# Patient Record
Sex: MALE | Race: BLACK OR AFRICAN AMERICAN | NOT HISPANIC OR LATINO | URBAN - METROPOLITAN AREA
[De-identification: names, ages, dates, MRNs, and addresses within clinical notes are randomized per-mention and may not be internally consistent; named-entity substitution may affect disease eponyms.]

---

## 2018-03-15 ENCOUNTER — EMERGENCY (EMERGENCY)
Facility: HOSPITAL | Age: 43
LOS: 1 days | Discharge: HOME | End: 2018-03-15
Admitting: EMERGENCY MEDICINE
Payer: SELF-PAY

## 2018-03-15 PROCEDURE — 99053 MED SERV 10PM-8AM 24 HR FAC: CPT

## 2018-03-15 PROCEDURE — 99284 EMERGENCY DEPT VISIT MOD MDM: CPT | Mod: 25

## 2018-03-16 PROCEDURE — 99283 EMERGENCY DEPT VISIT LOW MDM: CPT | Mod: 25

## 2018-03-16 PROCEDURE — 96372 THER/PROPH/DIAG INJ SC/IM: CPT

## 2018-07-07 ENCOUNTER — EMERGENCY (EMERGENCY)
Facility: HOSPITAL | Age: 43
LOS: 1 days | Discharge: HOME | End: 2018-07-07
Attending: EMERGENCY MEDICINE | Admitting: EMERGENCY MEDICINE
Payer: SELF-PAY

## 2018-07-07 VITALS
RESPIRATION RATE: 20 BRPM | HEART RATE: 87 BPM | DIASTOLIC BLOOD PRESSURE: 108 MMHG | TEMPERATURE: 99 F | SYSTOLIC BLOOD PRESSURE: 178 MMHG | WEIGHT: 218.26 LBS | OXYGEN SATURATION: 100 %

## 2018-07-07 VITALS
RESPIRATION RATE: 18 BRPM | SYSTOLIC BLOOD PRESSURE: 155 MMHG | DIASTOLIC BLOOD PRESSURE: 106 MMHG | OXYGEN SATURATION: 96 % | HEART RATE: 75 BPM

## 2018-07-07 DIAGNOSIS — Z95.5 PRESENCE OF CORONARY ANGIOPLASTY IMPLANT AND GRAFT: Chronic | ICD-10-CM

## 2018-07-07 PROCEDURE — 73502 X-RAY EXAM HIP UNI 2-3 VIEWS: CPT | Mod: 26,LT

## 2018-07-07 PROCEDURE — 99283 EMERGENCY DEPT VISIT LOW MDM: CPT

## 2018-07-07 PROCEDURE — 73502 X-RAY EXAM HIP UNI 2-3 VIEWS: CPT

## 2018-07-07 RX ORDER — IBUPROFEN 200 MG
600 TABLET ORAL ONCE
Qty: 0 | Refills: 0 | Status: DISCONTINUED | OUTPATIENT
Start: 2018-07-07 | End: 2018-07-07

## 2018-07-07 NOTE — ED ADULT NURSE NOTE - OBJECTIVE STATEMENT
Pt presents to ER with chief complaint of left hip pain that does not radiate. Pt denies strenuation, stating  "I just started hurting out of no where" Pt denies injury or fall, no s/s of distress or agitation noted. Hx of stent placement 2 years ago. Denies anticoagulation use. Will continue to monitor patient safety maintained

## 2018-07-07 NOTE — ED ADULT NURSE NOTE - CHPI ED SYMPTOMS NEG
no chills/no dizziness/no fever/no vomiting/no nausea/no numbness/no weakness/no decreased eating/drinking/no tingling

## 2018-07-07 NOTE — ED PROVIDER NOTE - LOCATION
hip hip/left inguinal point tenderness, no inguinal hernias appreciated hip/left inguinal point tenderness, no inguinal hernias appreciated. no lympadenopathy

## 2018-07-07 NOTE — ED PROVIDER NOTE - ATTENDING CONTRIBUTION TO CARE
Left hip/groin pain. Unable to assess etiology. Bony area when palpated. no buldge or masses felt. Educated patient on what to seek in case there is worsening. Advised PCP follow up . Will refer to clinic since he has none. NSAIDs recommended. I performed a face to face bedside interview with patient regarding history of present illness, review of symptoms and past medical history. I completed an independent physical exam.  I have discussed the patient's plan of care with Physician Assistant (PA). I agree with note as stated above, having amended the EMR as needed to reflect my findings.   This includes History of Present Illness, HIV, Past Medical/Surgical/Family/Social History, Allergies and Home Medications, Review of Systems, Physical Exam, and any Progress Notes during the time I functioned as the attending physician for this patient.

## 2018-07-07 NOTE — ED PROVIDER NOTE - CARDIAC, MLM
Normal rate, regular rhythm.  Heart sounds S1, S2.  No murmurs, rubs or gallops. Normal rate, regular rhythm

## 2018-07-07 NOTE — ED PROVIDER NOTE - MEDICAL DECISION MAKING DETAILS
X-ray of left hip. No inguinal hernia appreciated but discussed with patient signs and symptoms to look for. Will d/c home with outpatient follow up.

## 2018-07-07 NOTE — ED PROVIDER NOTE - OBJECTIVE STATEMENT
Pt is a 43yo male c/o left hip pain for the past week. States the pain is worsened with movement and when he goes from a sitting to standing position. The pain does not radiate and is sharp and stabbing in character when present. States the pain is an 8/10. He has tried icy hot without relief and has not tried anything else for the pain. Denies any known trauma to the area but does perform heavy lifting at work. Denies fevers, N/V/D, abdominal pain, urinary complaints, SOB, chest pain, palpitations.

## 2018-11-16 NOTE — ED ADULT NURSE NOTE - TEMPLATE
General Verbalized Understanding/Simple: Patient demonstrates quick and easy understanding/Patient asked questions

## 2018-12-17 ENCOUNTER — EMERGENCY (EMERGENCY)
Facility: HOSPITAL | Age: 43
LOS: 1 days | Discharge: HOME | End: 2018-12-17
Attending: EMERGENCY MEDICINE | Admitting: EMERGENCY MEDICINE
Payer: SELF-PAY

## 2018-12-17 DIAGNOSIS — R05 COUGH: ICD-10-CM

## 2018-12-17 DIAGNOSIS — Z95.5 PRESENCE OF CORONARY ANGIOPLASTY IMPLANT AND GRAFT: Chronic | ICD-10-CM

## 2018-12-17 PROCEDURE — 99283 EMERGENCY DEPT VISIT LOW MDM: CPT | Mod: 25

## 2018-12-17 PROCEDURE — 99053 MED SERV 10PM-8AM 24 HR FAC: CPT

## 2018-12-18 VITALS — SYSTOLIC BLOOD PRESSURE: 166 MMHG | DIASTOLIC BLOOD PRESSURE: 98 MMHG

## 2018-12-18 VITALS
TEMPERATURE: 98 F | HEART RATE: 87 BPM | OXYGEN SATURATION: 100 % | DIASTOLIC BLOOD PRESSURE: 134 MMHG | SYSTOLIC BLOOD PRESSURE: 184 MMHG | RESPIRATION RATE: 18 BRPM | HEIGHT: 69 IN | WEIGHT: 229.94 LBS

## 2018-12-18 PROCEDURE — 71046 X-RAY EXAM CHEST 2 VIEWS: CPT | Mod: 26

## 2018-12-18 PROCEDURE — 99284 EMERGENCY DEPT VISIT MOD MDM: CPT

## 2018-12-18 PROCEDURE — 71046 X-RAY EXAM CHEST 2 VIEWS: CPT

## 2018-12-18 RX ORDER — AMLODIPINE BESYLATE 2.5 MG/1
5 TABLET ORAL ONCE
Qty: 0 | Refills: 0 | Status: COMPLETED | OUTPATIENT
Start: 2018-12-18 | End: 2018-12-18

## 2018-12-18 RX ORDER — METOPROLOL TARTRATE 50 MG
25 TABLET ORAL ONCE
Qty: 0 | Refills: 0 | Status: COMPLETED | OUTPATIENT
Start: 2018-12-18 | End: 2018-12-18

## 2018-12-18 RX ORDER — METOPROLOL TARTRATE 50 MG
1 TABLET ORAL
Qty: 60 | Refills: 0
Start: 2018-12-18 | End: 2019-01-16

## 2018-12-18 RX ORDER — AMLODIPINE BESYLATE 2.5 MG/1
1 TABLET ORAL
Qty: 30 | Refills: 0
Start: 2018-12-18 | End: 2019-01-16

## 2018-12-18 RX ADMIN — AMLODIPINE BESYLATE 5 MILLIGRAM(S): 2.5 TABLET ORAL at 00:27

## 2018-12-18 RX ADMIN — Medication 25 MILLIGRAM(S): at 00:27

## 2018-12-18 RX ADMIN — Medication 200 MILLIGRAM(S): at 00:26

## 2018-12-18 NOTE — ED PROVIDER NOTE - OBJECTIVE STATEMENT
pt recovered from flu approximately 2 weeks ago, but now has had cough for the last 1 week and needs relief.  pt knows about his uncontrolled BP, states that he was on medication but then ran out and does not have insurance so does not see a doctor.  pt has no other symptoms.

## 2018-12-18 NOTE — ED PROVIDER NOTE - NSFOLLOWUPCLINICS_GEN_ALL_ED_FT
[facility]  Family Medicine  [address] [place] [id number]  Phone: [phone]  Fax:   Follow Up Time:

## 2018-12-18 NOTE — ED ADULT NURSE NOTE - NSIMPLEMENTINTERV_GEN_ALL_ED
Implemented All Universal Safety Interventions:  [place] to call system. Call bell, personal items and telephone within reach. Instruct patient to call for assistance. Room bathroom lighting operational. Non-slip footwear when patient is off stretcher. Physically safe environment: no spills, clutter or unnecessary equipment. Stretcher in lowest position, wheels locked, appropriate side rails in place.

## 2018-12-18 NOTE — ED PROVIDER NOTE - PROGRESS NOTE DETAILS
advised pt to go to Family Practice this week to set up care.  renewed pt's prescription for BP meds.

## 2019-05-14 ENCOUNTER — INPATIENT (INPATIENT)
Facility: HOSPITAL | Age: 44
LOS: 0 days | Discharge: HOME | DRG: 312 | End: 2019-05-15
Attending: HOSPITALIST | Admitting: HOSPITALIST
Payer: SELF-PAY

## 2019-05-14 VITALS
TEMPERATURE: 98 F | DIASTOLIC BLOOD PRESSURE: 127 MMHG | WEIGHT: 229.94 LBS | RESPIRATION RATE: 18 BRPM | OXYGEN SATURATION: 97 % | HEIGHT: 68 IN | SYSTOLIC BLOOD PRESSURE: 190 MMHG | HEART RATE: 84 BPM

## 2019-05-14 DIAGNOSIS — Z95.5 PRESENCE OF CORONARY ANGIOPLASTY IMPLANT AND GRAFT: Chronic | ICD-10-CM

## 2019-05-14 DIAGNOSIS — R55 SYNCOPE AND COLLAPSE: ICD-10-CM

## 2019-05-14 LAB
ALBUMIN SERPL ELPH-MCNC: 3.8 G/DL — SIGNIFICANT CHANGE UP (ref 3.3–5)
ALP SERPL-CCNC: 108 U/L — SIGNIFICANT CHANGE UP (ref 40–120)
ALT FLD-CCNC: 40 U/L DA — SIGNIFICANT CHANGE UP (ref 10–45)
ANION GAP SERPL CALC-SCNC: 8 MMOL/L — SIGNIFICANT CHANGE UP (ref 5–17)
APTT BLD: 34.1 SEC — SIGNIFICANT CHANGE UP (ref 27.5–36.3)
AST SERPL-CCNC: 30 U/L — SIGNIFICANT CHANGE UP (ref 10–40)
BILIRUB SERPL-MCNC: 0.6 MG/DL — SIGNIFICANT CHANGE UP (ref 0.2–1.2)
BUN SERPL-MCNC: 14 MG/DL — SIGNIFICANT CHANGE UP (ref 7–23)
CALCIUM SERPL-MCNC: 9.4 MG/DL — SIGNIFICANT CHANGE UP (ref 8.4–10.5)
CHLORIDE SERPL-SCNC: 101 MMOL/L — SIGNIFICANT CHANGE UP (ref 96–108)
CO2 SERPL-SCNC: 27 MMOL/L — SIGNIFICANT CHANGE UP (ref 22–31)
CREAT SERPL-MCNC: 0.95 MG/DL — SIGNIFICANT CHANGE UP (ref 0.5–1.3)
D DIMER BLD IA.RAPID-MCNC: <150 NG/ML DDU — SIGNIFICANT CHANGE UP
GLUCOSE SERPL-MCNC: 104 MG/DL — HIGH (ref 70–99)
HCT VFR BLD CALC: 41.7 % — SIGNIFICANT CHANGE UP (ref 39–50)
HGB BLD-MCNC: 14.3 G/DL — SIGNIFICANT CHANGE UP (ref 13–17)
INR BLD: 1.03 RATIO — SIGNIFICANT CHANGE UP (ref 0.88–1.16)
MCHC RBC-ENTMCNC: 27.8 PG — SIGNIFICANT CHANGE UP (ref 27–34)
MCHC RBC-ENTMCNC: 34.3 GM/DL — SIGNIFICANT CHANGE UP (ref 32–36)
MCV RBC AUTO: 81.1 FL — SIGNIFICANT CHANGE UP (ref 80–100)
NRBC # BLD: 0 /100 WBCS — SIGNIFICANT CHANGE UP (ref 0–0)
PLATELET # BLD AUTO: 291 K/UL — SIGNIFICANT CHANGE UP (ref 150–400)
POTASSIUM SERPL-MCNC: 4.2 MMOL/L — SIGNIFICANT CHANGE UP (ref 3.5–5.3)
POTASSIUM SERPL-SCNC: 4.2 MMOL/L — SIGNIFICANT CHANGE UP (ref 3.5–5.3)
PROT SERPL-MCNC: 7.9 G/DL — SIGNIFICANT CHANGE UP (ref 6–8.3)
PROTHROM AB SERPL-ACNC: 11.5 SEC — SIGNIFICANT CHANGE UP (ref 10–12.9)
RBC # BLD: 5.14 M/UL — SIGNIFICANT CHANGE UP (ref 4.2–5.8)
RBC # FLD: 13.1 % — SIGNIFICANT CHANGE UP (ref 10.3–14.5)
SODIUM SERPL-SCNC: 136 MMOL/L — SIGNIFICANT CHANGE UP (ref 135–145)
TROPONIN I SERPL-MCNC: <.017 NG/ML — LOW (ref 0.02–0.06)
WBC # BLD: 6.96 K/UL — SIGNIFICANT CHANGE UP (ref 3.8–10.5)
WBC # FLD AUTO: 6.96 K/UL — SIGNIFICANT CHANGE UP (ref 3.8–10.5)

## 2019-05-14 PROCEDURE — 70450 CT HEAD/BRAIN W/O DYE: CPT | Mod: 26

## 2019-05-14 PROCEDURE — 93010 ELECTROCARDIOGRAM REPORT: CPT

## 2019-05-14 PROCEDURE — 71045 X-RAY EXAM CHEST 1 VIEW: CPT | Mod: 26

## 2019-05-14 PROCEDURE — 99284 EMERGENCY DEPT VISIT MOD MDM: CPT

## 2019-05-14 PROCEDURE — 99223 1ST HOSP IP/OBS HIGH 75: CPT

## 2019-05-14 RX ORDER — LABETALOL HCL 100 MG
20 TABLET ORAL ONCE
Refills: 0 | Status: COMPLETED | OUTPATIENT
Start: 2019-05-14 | End: 2019-05-14

## 2019-05-14 RX ORDER — ASPIRIN/CALCIUM CARB/MAGNESIUM 324 MG
162 TABLET ORAL ONCE
Refills: 0 | Status: COMPLETED | OUTPATIENT
Start: 2019-05-14 | End: 2019-05-14

## 2019-05-14 RX ORDER — METOPROLOL TARTRATE 50 MG
25 TABLET ORAL DAILY
Refills: 0 | Status: DISCONTINUED | OUTPATIENT
Start: 2019-05-14 | End: 2019-05-14

## 2019-05-14 RX ORDER — ATORVASTATIN CALCIUM 80 MG/1
20 TABLET, FILM COATED ORAL AT BEDTIME
Refills: 0 | Status: DISCONTINUED | OUTPATIENT
Start: 2019-05-14 | End: 2019-05-15

## 2019-05-14 RX ORDER — REGADENOSON 0.08 MG/ML
0.4 INJECTION, SOLUTION INTRAVENOUS ONCE
Refills: 0 | Status: COMPLETED | OUTPATIENT
Start: 2019-05-14 | End: 2019-05-15

## 2019-05-14 RX ORDER — METOPROLOL TARTRATE 50 MG
25 TABLET ORAL DAILY
Refills: 0 | Status: DISCONTINUED | OUTPATIENT
Start: 2019-05-14 | End: 2019-05-15

## 2019-05-14 RX ORDER — ASPIRIN/CALCIUM CARB/MAGNESIUM 324 MG
81 TABLET ORAL DAILY
Refills: 0 | Status: DISCONTINUED | OUTPATIENT
Start: 2019-05-14 | End: 2019-05-15

## 2019-05-14 RX ADMIN — Medication 162 MILLIGRAM(S): at 14:19

## 2019-05-14 RX ADMIN — ATORVASTATIN CALCIUM 20 MILLIGRAM(S): 80 TABLET, FILM COATED ORAL at 20:51

## 2019-05-14 RX ADMIN — Medication 20 MILLIGRAM(S): at 14:16

## 2019-05-14 NOTE — H&P ADULT - HISTORY OF PRESENT ILLNESS
43M with CAD hx of PCI 3 years ago who presents after two episodes of syncope. Patient was walking this morning at 545AM, when he suddenly had a sensation where the room was spinning. It was a persistent sensation that lasted 10 min and resolved. Then, at 9AM, he was climbing a ladder - when he suddenly felt like he was going to fall - and landed in his colleague's arms (the patient works at [employer] - his associates where nearby and caught him so he never fell.) 0/10 pain. NO CHEST PAIN. No SOB. No N/V. No diaphoresis. Patient at his baseline at this time.

## 2019-05-14 NOTE — H&P ADULT - NSHPLABSRESULTS_GEN_ALL_CORE
.                            14.3   6.96  )-----------( 291      ( 14 May 2019 03:05 )             41.7       05-14    136  |  101  |  14  ----------------------------<  104  4.2   |  27  |  0.95    Ca    9.4      14 May 2019 03:05    TPro  7.9  /  Alb  3.8  /  TBili  0.6  /  DBili  x   /  AST  30  /  ALT  40  /  AlkPhos  108  05-14    PT/INR - ( 14 May 2019 13:40 )   PT: 11.5 sec;   INR: 1.03 ratio         PTT - ( 14 May 2019 13:40 )  PTT:34.1 sec  CARDIAC MARKERS ( 14 May 2019 03:05 )  <.017 ng/mL / x     / x     / x     / x          EKG: NSR at 81 - reviewed by me    < from: CT Head No Cont (05.14.19 @ 13:56) >    Unremarkable noncontrast CT of the brain.    < end of copied text >    Case d/w Dr. Gibson, Dr. Day

## 2019-05-14 NOTE — ED PROVIDER NOTE - OBJECTIVE STATEMENT
pt 44 yo m hx MI 2015 with cardiac stents c/o 2 near syncopal events PTA. pt was standing and he started to have pt 42 yo m hx MI 2015 with cardiac stents c/o 2 near syncopal events PTA. pt was standing and he started to have lightheadedness and blurry vision. which lasted about 10 minutes. pt then climbed a ladder at work and had the same feeling but this time thinks he may have had a syncopal event and fel off the ladder. pts co worker caught him. no injury from fall. symptoms resolved but pt currently feels tired   denies CP, SOB, PINA, leg swelling, cough, fever, chills, n/v, abd pain. denies recent travel or recent illness  has not been to cardiologist in  years. pt is only taking asa 81mg  significant fhx for MI, stroke, DVT

## 2019-05-14 NOTE — H&P ADULT - ASSESSMENT
43M with CAD hx of stent x 1, presents with two syncopal episodes. The first episode likely vasovagal, the second is of unclear etiology    #Syncope, unclear etiology  -Orthostatics  -Serial troponins  -Echo  -Stress test in AM  -Cardiology following    #CAD, non-compliant with medications, hx of stent placement  -ASA, beta blocker, statin  -lipid panel, check a1c    #Nicotine dependence  -Patient declines NRT at this time  -Cessation reviewed      DVT ppx: low risk, ambulation encouraged    IMPROVE VTE Individual Risk Assessment          RISK                                                          Points  [  ] Previous VTE                                                3  [  ] Thrombophilia                                             2  [  ] Lower limb paralysis                                   2        (unable to hold up >15 seconds)    [  ] Current Cancer                                             2         (within 6 months)  [  ] Immobilization > 24 hrs                              1  [  ] ICU/CCU stay > 24 hours                             1  [  ] Age > 60                                                         1    IMPROVE VTE Score: 0

## 2019-05-14 NOTE — ED PROVIDER NOTE - CLINICAL SUMMARY MEDICAL DECISION MAKING FREE TEXT BOX
pt 42 yo m hx MI 2015 with cardiac stents c/o 2 near syncopal events PTA. pt was standing and he started to have lightheadedness and blurry vision. which lasted about 10 minutes. pt then climbed a ladder at work and had the same feeling but this time thinks he may have had a syncopal event and fel off the ladder. pts co worker caught him. no injury from fall. symptoms resolved but pt currently feels tired pt 44 yo m hx MI 2015 with cardiac stents c/o 2 near syncopal events PTA. pt was standing and he started to have lightheadedness and blurry vision. which lasted about 10 minutes. pt then climbed a ladder at work and had the same feeling but this time thinks he may have had a syncopal event and fel off the ladder. pts co worker caught him. no injury from fall. symptoms resolved but pt currently feels tired. all labs including troponin and d-dimer negative. cxr neg, ct head no acute findings, discussed with Dr. Ceron- pt is a high risk. will admit

## 2019-05-14 NOTE — ED ADULT NURSE NOTE - OBJECTIVE STATEMENT
42 y/o male hx of 1 stent 3 years ago came in c/o 2 syncopial episodes at work today. pt states he has been seen ehre before for htn and was prescribed metoporolol and has not followed up. pt states today he turned too quickly and blacked out. pt states his coworker caught him pt states he believes it was for about 10 minutes. pt continued working and had another syncopial episode that lasted a few seconds. pt denies getting any aura or feeling anything prior to both episodes. pt denies any chest pain no sob. no fever no chills. pt denies feeling dizzy he states he just feels weird. bp is elevated. Neuro intact. PERRLA.placed on cm continue to mother.

## 2019-05-14 NOTE — ED ADULT NURSE REASSESSMENT NOTE - NS ED NURSE REASSESS COMMENT FT1
pt resting. sleeping awaiting orders from inpatient orders to go up. pt has bed upstairs. no complaints at this time

## 2019-05-14 NOTE — ED PROVIDER NOTE - ATTENDING CONTRIBUTION TO CARE
Daisy with [facility]. 43M active smoker, hx MI 2015 with cardiac stents c/o 2 near syncopal events PTA. pt was standing and he started to have lightheadedness and blurry vision. which lasted about 10 minutes. pt then climbed a ladder at work and had the same feeling but this time had a syncopal event and fell off the ladder. pts co worker caught him. no injury from fall. symptoms resolved but pt currently feels tired. all labs including troponin and d-dimer negative. cxr neg, ct head no acute findings, discussed with Dr. Ceron- pt is a high risk. will admit. I performed a face to face bedside interview with patient regarding history of present illness, review of symptoms and past medical history. I completed an independent physical exam.  I have discussed the patient's plan of care with Physician Assistant (PA). I agree with note as stated above, having amended the EMR as needed to reflect my findings.   This includes History of Present Illness, HIV, Past Medical/Surgical/Family/Social History, Allergies and Home Medications, Review of Systems, Physical Exam, and any Progress Notes during the time I functioned as the attending physician for this patient.

## 2019-05-14 NOTE — ED ADULT TRIAGE NOTE - CHIEF COMPLAINT QUOTE
I almost passed out at work this morning, my coworker caught me as I collapsed. Then I almost fell off a ladder when it happened again

## 2019-05-14 NOTE — H&P ADULT - NSHPSOCIALHISTORY_GEN_ALL_CORE
Current smoker, 1/2 PPD X 20 years, does not want NRT    Fam Hx: Mother, living, 75 yo, MI/CVA; father  at 50 of HIV

## 2019-05-14 NOTE — CONSULT NOTE ADULT - SUBJECTIVE AND OBJECTIVE BOX
Chief Complaint:     43M with CAD hx of PCI 3 years ago who presents after two episodes of syncope. Patient was walking this morning at 545AM, when he suddenly had a sensation where the room was spinning. It was a persistent sensation that lasted 10 min and resolved. Then, at 9AM, he was climbing a ladder - when he suddenly felt like he was going to fall - and landed in his colleague's arms (the patient works at [employer] - his associates where nearby and caught him so he never fell.) 0/10 pain. NO CHEST PAIN. No SOB. No N/V. No diaphoresis. Patient at his baseline at this time.    HPI:    PMH:   MI (myocardial infarction)  Essential hypertension  Smoker    PSH:   H/O right coronary artery stent placement  No significant past surgical history    Family History:  FAMILY HISTORY: father had HIV   CAD (coronary artery disease): mother had CABG multiple family membes with coronary disease      Social History:  Smoking:  Alcohol:  Drugs:    Allergies:  No Known Allergies      Medications:  aspirin enteric coated 81 milliGRAM(s) Oral daily  atorvastatin 20 milliGRAM(s) Oral at bedtime  metoprolol succinate ER 25 milliGRAM(s) Oral daily  regadenoson Injectable 0.4 milliGRAM(s) IV Push once      REVIEW OF SYSTEMS:  CONSTITUTIONAL: No fever, weight loss, or fatigue  EYES: No eye pain, visual disturbances, or discharge  ENMT:  No difficulty hearing, tinnitus, vertigo; No sinus or throat pain  NECK: No pain or stiffness  BREASTS: No pain, masses, or nipple discharge  RESPIRATORY: No cough, wheezing, chills or hemoptysis; No shortness of breath  CARDIOVASCULAR: No chest pain, palpitations, dizziness, or leg swelling  GASTROINTESTINAL: No abdominal or epigastric pain. No nausea, vomiting, or hematemesis; No diarrhea or constipation. No melena or hematochezia.  GENITOURINARY: No dysuria, frequency, hematuria, or incontinence  NEUROLOGICAL: No headaches, memory loss, loss of strength, numbness, or tremors  SKIN: No itching, burning, rashes, or lesions   LYMPH NODES: No enlarged glands  ENDOCRINE: No heat or cold intolerance; No hair loss  MUSCULOSKELETAL: No joint pain or swelling; No muscle, back, or extremity pain  PSYCHIATRIC: No depression, anxiety, mood swings, or difficulty sleeping  HEME/LYMPH: No easy bruising, or bleeding gums  ALLERY AND IMMUNOLOGIC: No hives or eczema    Physical Exam:  T(C): 36.6 (05-14-19 @ 17:09), Max: 36.8 (05-14-19 @ 12:47)  HR: 82 (05-14-19 @ 17:09) (72 - 86)  BP: 121/72 (05-14-19 @ 17:09) (121/72 - 190/127)  RR: 16 (05-14-19 @ 17:09) (14 - 18)  SpO2: 99% (05-14-19 @ 17:09) (97% - 100%)  Wt(kg): --    GENERAL: NAD, well-groomed, well-developed  HEAD:  Atraumatic, Normocephalic  EYES: EOMI, conjunctiva and sclera clear  ENT: Moist mucous membranes,  NECK: Supple, No JVD, no bruits  CHEST/LUNG: Clear to percussion bilaterally; No rales, rhonchi, wheezing, or rubs  HEART: Regular rate and rhythm; No murmurs, rubs, or gallops PMI non displaced.  ABDOMEN: Soft, Nontender, Nondistended; Bowel sounds present  EXTREMITIES:  2+ Peripheral Pulses, No clubbing, cyanosis, or edema  SKIN: No rashes or lesions  NERVOUS SYSTEM:  Cranial Nerves II-XII intact     Cardiovascular Diagnostic Testing:  ECG:    rsr normal    ECHO:    Labs:                        14.3   6.96  )-----------( 291      ( 14 May 2019 03:05 )             41.7     05-14    136  |  101  |  14  ----------------------------<  104<H>  4.2   |  27  |  0.95    Ca    9.4      14 May 2019 03:05    TPro  7.9  /  Alb  3.8  /  TBili  0.6  /  DBili  x   /  AST  30  /  ALT  40  /  AlkPhos  108  05-14    PT/INR - ( 14 May 2019 13:40 )   PT: 11.5 sec;   INR: 1.03 ratio         PTT - ( 14 May 2019 13:40 )  PTT:34.1 sec  CARDIAC MARKERS ( 14 May 2019 03:05 )  <.017 ng/mL / x     / x     / x     / x          Imaging:

## 2019-05-14 NOTE — CONSULT NOTE ADULT - ATTENDING COMMENTS
syncope   I suspect a definite etiology will not be forthcoming. will monitor over nite and check for arrhythmia. would look for vt post a cardiac stent  check echo for eval of lv function and risk of cardiac sudden death      coronary disease history of a cardia stent  history of a cardiac stent is of concern would check nuclear stress in am. check fasting lipids. maintain aspirin

## 2019-05-14 NOTE — H&P ADULT - NSHPPHYSICALEXAM_GEN_ALL_CORE
Vital Signs Last 24 Hrs  T(F): 97.9 (14 May 2019 17:09), Max: 98.2 (14 May 2019 12:47)  HR: 82 (14 May 2019 17:09) (72 - 86)  BP: 121/72 (14 May 2019 17:09) (121/72 - 190/127)  RR: 16 (14 May 2019 17:09) (14 - 18)  SpO2: 99% (14 May 2019 17:09) (97% - 100%)    PHYSICAL EXAM:  GENERAL: NAD, well-groomed, well-developed  HEAD:  Atraumatic, Normocephalic  EYES: EOMI, conjunctiva and sclera clear  ENMT: Moist mucous membranes, Good dentition, no thrush  NECK: Supple, No JVD  CHEST/LUNG: Clear to auscultation bilaterally, good air entry, non-labored breathing  HEART: Regular rate and rhythm; S1/S2, No murmur  ABDOMEN: Soft, Nontender, Nondistended; Bowel sounds present  VASCULAR: Normal pulses, Normal capillary refill  EXTREMITIES: No calf tenderness, No cyanosis, No edema  LYMPH: No lymphadenopathy noted  SKIN: Warm, Intact  PSYCH: Normal mood, Normal affect  NERVOUS SYSTEM:  A/O x3, Good concentration; CN 2-12 intact, No focal deficits

## 2019-05-15 ENCOUNTER — TRANSCRIPTION ENCOUNTER (OUTPATIENT)
Age: 44
End: 2019-05-15

## 2019-05-15 VITALS
SYSTOLIC BLOOD PRESSURE: 151 MMHG | RESPIRATION RATE: 16 BRPM | DIASTOLIC BLOOD PRESSURE: 82 MMHG | OXYGEN SATURATION: 98 % | HEART RATE: 85 BPM

## 2019-05-15 PROCEDURE — 99285 EMERGENCY DEPT VISIT HI MDM: CPT | Mod: 25

## 2019-05-15 PROCEDURE — 93016 CV STRESS TEST SUPVJ ONLY: CPT

## 2019-05-15 PROCEDURE — 80053 COMPREHEN METABOLIC PANEL: CPT

## 2019-05-15 PROCEDURE — 85730 THROMBOPLASTIN TIME PARTIAL: CPT

## 2019-05-15 PROCEDURE — 70450 CT HEAD/BRAIN W/O DYE: CPT

## 2019-05-15 PROCEDURE — 93005 ELECTROCARDIOGRAM TRACING: CPT

## 2019-05-15 PROCEDURE — 71045 X-RAY EXAM CHEST 1 VIEW: CPT

## 2019-05-15 PROCEDURE — 84484 ASSAY OF TROPONIN QUANT: CPT

## 2019-05-15 PROCEDURE — 85027 COMPLETE CBC AUTOMATED: CPT

## 2019-05-15 PROCEDURE — 85379 FIBRIN DEGRADATION QUANT: CPT

## 2019-05-15 PROCEDURE — 78452 HT MUSCLE IMAGE SPECT MULT: CPT

## 2019-05-15 PROCEDURE — A9500: CPT

## 2019-05-15 PROCEDURE — 93018 CV STRESS TEST I&R ONLY: CPT

## 2019-05-15 PROCEDURE — 93010 ELECTROCARDIOGRAM REPORT: CPT

## 2019-05-15 PROCEDURE — 93306 TTE W/DOPPLER COMPLETE: CPT

## 2019-05-15 PROCEDURE — 93017 CV STRESS TEST TRACING ONLY: CPT

## 2019-05-15 PROCEDURE — 36415 COLL VENOUS BLD VENIPUNCTURE: CPT

## 2019-05-15 PROCEDURE — 96374 THER/PROPH/DIAG INJ IV PUSH: CPT

## 2019-05-15 PROCEDURE — 93306 TTE W/DOPPLER COMPLETE: CPT | Mod: 26

## 2019-05-15 PROCEDURE — 85610 PROTHROMBIN TIME: CPT

## 2019-05-15 PROCEDURE — 99239 HOSP IP/OBS DSCHRG MGMT >30: CPT

## 2019-05-15 RX ORDER — METOPROLOL TARTRATE 50 MG
1 TABLET ORAL
Qty: 30 | Refills: 0
Start: 2019-05-15 | End: 2019-06-13

## 2019-05-15 RX ORDER — ATORVASTATIN CALCIUM 80 MG/1
1 TABLET, FILM COATED ORAL
Qty: 30 | Refills: 0
Start: 2019-05-15 | End: 2019-06-13

## 2019-05-15 RX ORDER — LISINOPRIL 2.5 MG/1
1 TABLET ORAL
Qty: 30 | Refills: 0
Start: 2019-05-15 | End: 2019-06-13

## 2019-05-15 RX ADMIN — Medication 81 MILLIGRAM(S): at 14:59

## 2019-05-15 RX ADMIN — REGADENOSON 0.4 MILLIGRAM(S): 0.08 INJECTION, SOLUTION INTRAVENOUS at 11:10

## 2019-05-15 NOTE — PROGRESS NOTE ADULT - SUBJECTIVE AND OBJECTIVE BOX
Patient is a 43y old  Male who presents with a chief complaint of Syncope (14 May 2019 20:35)    Feels well. No chest pain, sob.      Patient seen and examined at bedside.    ALLERGIES:  No Known Allergies    MEDICATIONS  (STANDING):  aspirin enteric coated 81 milliGRAM(s) Oral daily  atorvastatin 20 milliGRAM(s) Oral at bedtime  metoprolol succinate ER 25 milliGRAM(s) Oral daily  regadenoson Injectable 0.4 milliGRAM(s) IV Push once    MEDICATIONS  (PRN):    Vital Signs Last 24 Hrs  T(F): 99.3 (15 May 2019 05:10), Max: 99.3 (15 May 2019 05:10)  HR: 96 (15 May 2019 05:10) (72 - 96)  BP: 156/80 (15 May 2019 05:10) (121/72 - 190/127)  RR: 16 (15 May 2019 05:10) (14 - 18)  SpO2: 98% (15 May 2019 05:10) (97% - 100%)  I&O's Summary    14 May 2019 07:01  -  15 May 2019 07:00  --------------------------------------------------------  IN: 200 mL / OUT: 0 mL / NET: 200 mL      BMI (kg/m2): 39.4 (05-14-19 @ 17:09)  PHYSICAL EXAM:  General: NAD, A/O x 3  ENT: MMM  Neck: Supple, No JVD  Lungs: Clear to auscultation bilaterally  Cardio: RRR, S1/S2, No murmurs  Abdomen: Soft, Nontender, Nondistended; Bowel sounds present  Extremities: No calf tenderness, No pitting edema    LABS:                        14.3   6.96  )-----------( 291      ( 14 May 2019 03:05 )             41.7       05-14    136  |  101  |  14  ----------------------------<  104  4.2   |  27  |  0.95    Ca    9.4      14 May 2019 03:05    TPro  7.9  /  Alb  3.8  /  TBili  0.6  /  DBili  x   /  AST  30  /  ALT  40  /  AlkPhos  108  05-14     eGFR if African American: 114 mL/min/1.73M2 (05-14-19 @ 03:05)  eGFR if Non African American: 99 mL/min/1.73M2 (05-14-19 @ 03:05)    PT/INR - ( 14 May 2019 13:40 )   PT: 11.5 sec;   INR: 1.03 ratio         PTT - ( 14 May 2019 13:40 )  PTT:34.1 sec     CARDIAC MARKERS ( 14 May 2019 03:05 )  <.017 ng/mL / x     / x     / x     / x        CAPILLARY BLOOD GLUCOSE    RADIOLOGY & ADDITIONAL TESTS:    Care Discussed with Consultants/Other Providers: YES

## 2019-05-15 NOTE — DISCHARGE NOTE NURSING/CASE MANAGEMENT/SOCIAL WORK - NSDCDPATPORTLINK_GEN_ALL_CORE
You can access the [facility] Patient Portal, offered by [facility], by registering with the following website: [email or web address]

## 2019-05-15 NOTE — DISCHARGE NOTE PROVIDER - HOSPITAL COURSE
43M with CAD hx of PCI 3 years ago who presents after two episodes of syncope. Patient was walking this morning at 545AM, when he suddenly had a sensation where the room was spinning. It was a persistent sensation that lasted 10 min and resolved. Then, at 9AM, he was climbing a ladder - when he suddenly felt like he was going to fall - and landed in his colleague's arms (the patient works at [employer] - his associates where nearby and caught him so he never fell.) 0/10 pain. NO CHEST PAIN. No SOB. No N/V. No diaphoresis. Patient at his baseline at this time.         Patient admitted and monitored overnight on telemetry.  Patient ruled out ACS and underwent a nuclear stress test which was negative.         Patient advised to follow up with a primary care physician.

## 2019-05-15 NOTE — PROGRESS NOTE ADULT - ASSESSMENT
43M with CAD hx of stent x 1, presents with two syncopal episodes. The first episode likely vasovagal, the second is of unclear etiology    Syncope, unclear etiology  -Orthostatics  -Serial troponins  -Echo  - nuclear stress test today  - if negative, suspect vasovagal    CAD, non-compliant with medications, hx of stent placement  - educated patient on importance of taking medication and follow up with PCP   -ASA, beta blocker, statin  -lipid panel, check a1c    Nicotine dependence - 1/2 pack per day  - advised cessation as likely contributing to his early CAD

## 2019-05-15 NOTE — DISCHARGE NOTE PROVIDER - NSDCCPCAREPLAN_GEN_ALL_CORE_FT
PRINCIPAL DISCHARGE DIAGNOSIS  Diagnosis: Syncope, unspecified syncope type  Assessment and Plan of Treatment:

## 2020-01-09 NOTE — CONSULT NOTE ADULT - PROVIDER SPECIALTY LIST ADULT
Problem: Patient Care Overview  Goal: Plan of Care Review  Flowsheets  Taken 1/8/2020 0312 by Connie Yao RN  Progress: improving  Taken 1/8/2020 2215 by Jaxson Auguste, RN  Plan of Care Reviewed With: patient  Taken 1/9/2020 0319 by Jaxson Auguste, RN  Outcome Summary: pt no c/o pain. IV fluids continue. vss. pt resting comfortably     Problem: Renal Failure/Kidney Injury, Acute (Adult)  Goal: Signs and Symptoms of Listed Potential Problems Will be Absent, Minimized or Managed (Renal Failure/Kidney Injury, Acute)  Flowsheets  Taken 1/8/2020 0312 by Connie Yao RN  Problems Assessed (Acute Renal Failure/Kidney Injury): all  Taken 1/8/2020 0243 by Connie Yao RN  Problems Present (ARF/Kidney Injury): electrolyte imbalance;fluid imbalance      Cardiology

## 2020-03-11 ENCOUNTER — EMERGENCY (EMERGENCY)
Facility: HOSPITAL | Age: 45
LOS: 1 days | Discharge: HOME | End: 2020-03-11
Attending: EMERGENCY MEDICINE | Admitting: HOSPITALIST
Payer: MEDICAID

## 2020-03-11 VITALS
WEIGHT: 240.08 LBS | HEIGHT: 68 IN | RESPIRATION RATE: 17 BRPM | TEMPERATURE: 98 F | DIASTOLIC BLOOD PRESSURE: 122 MMHG | OXYGEN SATURATION: 99 % | HEART RATE: 97 BPM | SYSTOLIC BLOOD PRESSURE: 183 MMHG

## 2020-03-11 DIAGNOSIS — Z95.5 PRESENCE OF CORONARY ANGIOPLASTY IMPLANT AND GRAFT: Chronic | ICD-10-CM

## 2020-03-11 LAB
ALBUMIN SERPL ELPH-MCNC: 3.5 G/DL — SIGNIFICANT CHANGE UP (ref 3.3–5)
ALP SERPL-CCNC: 127 U/L — HIGH (ref 40–120)
ALT FLD-CCNC: 39 U/L — SIGNIFICANT CHANGE UP (ref 10–45)
ANION GAP SERPL CALC-SCNC: 6 MMOL/L — SIGNIFICANT CHANGE UP (ref 5–17)
AST SERPL-CCNC: 19 U/L — SIGNIFICANT CHANGE UP (ref 10–40)
BILIRUB SERPL-MCNC: 0.1 MG/DL — LOW (ref 0.2–1.2)
BUN SERPL-MCNC: 8 MG/DL — SIGNIFICANT CHANGE UP (ref 7–23)
CALCIUM SERPL-MCNC: 9 MG/DL — SIGNIFICANT CHANGE UP (ref 8.4–10.5)
CHLORIDE SERPL-SCNC: 105 MMOL/L — SIGNIFICANT CHANGE UP (ref 96–108)
CO2 SERPL-SCNC: 29 MMOL/L — SIGNIFICANT CHANGE UP (ref 22–31)
CREAT SERPL-MCNC: 1.11 MG/DL — SIGNIFICANT CHANGE UP (ref 0.5–1.3)
GLUCOSE SERPL-MCNC: 120 MG/DL — HIGH (ref 70–99)
HCT VFR BLD CALC: 39.7 % — SIGNIFICANT CHANGE UP (ref 39–50)
HGB BLD-MCNC: 13.6 G/DL — SIGNIFICANT CHANGE UP (ref 13–17)
LIDOCAIN IGE QN: 228 U/L — SIGNIFICANT CHANGE UP (ref 73–393)
MCHC RBC-ENTMCNC: 28.5 PG — SIGNIFICANT CHANGE UP (ref 27–34)
MCHC RBC-ENTMCNC: 34.3 GM/DL — SIGNIFICANT CHANGE UP (ref 32–36)
MCV RBC AUTO: 83.2 FL — SIGNIFICANT CHANGE UP (ref 80–100)
NRBC # BLD: 0 /100 WBCS — SIGNIFICANT CHANGE UP (ref 0–0)
PLATELET # BLD AUTO: 344 K/UL — SIGNIFICANT CHANGE UP (ref 150–400)
POTASSIUM SERPL-MCNC: 4.2 MMOL/L — SIGNIFICANT CHANGE UP (ref 3.5–5.3)
POTASSIUM SERPL-SCNC: 4.2 MMOL/L — SIGNIFICANT CHANGE UP (ref 3.5–5.3)
PROT SERPL-MCNC: 7.8 G/DL — SIGNIFICANT CHANGE UP (ref 6–8.3)
RBC # BLD: 4.77 M/UL — SIGNIFICANT CHANGE UP (ref 4.2–5.8)
RBC # FLD: 13.2 % — SIGNIFICANT CHANGE UP (ref 10.3–14.5)
SODIUM SERPL-SCNC: 140 MMOL/L — SIGNIFICANT CHANGE UP (ref 135–145)
TROPONIN I SERPL-MCNC: <.017 NG/ML — LOW (ref 0.02–0.06)
WBC # BLD: 8.3 K/UL — SIGNIFICANT CHANGE UP (ref 3.8–10.5)
WBC # FLD AUTO: 8.3 K/UL — SIGNIFICANT CHANGE UP (ref 3.8–10.5)

## 2020-03-11 PROCEDURE — 71046 X-RAY EXAM CHEST 2 VIEWS: CPT | Mod: 26

## 2020-03-11 PROCEDURE — 99285 EMERGENCY DEPT VISIT HI MDM: CPT

## 2020-03-11 PROCEDURE — 93010 ELECTROCARDIOGRAM REPORT: CPT

## 2020-03-11 RX ORDER — ASPIRIN/CALCIUM CARB/MAGNESIUM 324 MG
324 TABLET ORAL ONCE
Refills: 0 | Status: COMPLETED | OUTPATIENT
Start: 2020-03-11 | End: 2020-03-11

## 2020-03-11 RX ORDER — METOPROLOL TARTRATE 50 MG
5 TABLET ORAL ONCE
Refills: 0 | Status: COMPLETED | OUTPATIENT
Start: 2020-03-11 | End: 2020-03-11

## 2020-03-11 RX ADMIN — Medication 5 MILLIGRAM(S): at 22:34

## 2020-03-11 RX ADMIN — Medication 324 MILLIGRAM(S): at 22:47

## 2020-03-11 NOTE — ED PROVIDER NOTE - CARDIAC, MLM
Normal rate, regular rhythm.  Heart sounds S1, S2.  No murmurs, rubs or gallops. nontender chest. 2+ rad pulses

## 2020-03-11 NOTE — ED PROVIDER NOTE - CLINICAL SUMMARY MEDICAL DECISION MAKING FREE TEXT BOX
45yo M c CAD, stent, HTN, not compliant with meds c typical mid chest pain, now resolved. hypertensive. concern for ACS. ekg no STEMI.  will check labs, trop. give lopressor for bp  control. will need admission

## 2020-03-11 NOTE — ED ADULT NURSE NOTE - OBJECTIVE STATEMENT
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 Pt denies sob at this time. Pt denies n/v, fevers/chills.

## 2020-03-11 NOTE — ED PROVIDER NOTE - OBJECTIVE STATEMENT
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 assoc c sob. lasted few minutes. no pain now.  no leg pain/swelling /recent periods of immobility.  pain started after smoking. no fever. had mild cough, runny nose last few days. has not taking any meds (for BP, asa) due to lack of insurance.

## 2020-03-12 ENCOUNTER — TRANSCRIPTION ENCOUNTER (OUTPATIENT)
Age: 45
End: 2020-03-12

## 2020-03-12 VITALS
TEMPERATURE: 98 F | HEART RATE: 71 BPM | DIASTOLIC BLOOD PRESSURE: 104 MMHG | OXYGEN SATURATION: 99 % | SYSTOLIC BLOOD PRESSURE: 161 MMHG | RESPIRATION RATE: 16 BRPM

## 2020-03-12 LAB
CHOLEST SERPL-MCNC: 246 MG/DL — HIGH (ref 10–199)
HBA1C BLD-MCNC: 5.7 % — HIGH (ref 4–5.6)
HDLC SERPL-MCNC: 29 MG/DL — LOW
LIPID PNL WITH DIRECT LDL SERPL: 184 MG/DL — HIGH
TOTAL CHOLESTEROL/HDL RATIO MEASUREMENT: 8.5 RATIO — SIGNIFICANT CHANGE UP (ref 3.4–9.6)
TRIGL SERPL-MCNC: 166 MG/DL — HIGH (ref 10–149)
TROPONIN I SERPL-MCNC: <.017 NG/ML — LOW (ref 0.02–0.06)
TSH SERPL-MCNC: 1.78 UIU/ML — SIGNIFICANT CHANGE UP (ref 0.27–4.2)

## 2020-03-12 PROCEDURE — 83690 ASSAY OF LIPASE: CPT

## 2020-03-12 PROCEDURE — 85027 COMPLETE CBC AUTOMATED: CPT

## 2020-03-12 PROCEDURE — A9500: CPT

## 2020-03-12 PROCEDURE — 84484 ASSAY OF TROPONIN QUANT: CPT

## 2020-03-12 PROCEDURE — 93005 ELECTROCARDIOGRAM TRACING: CPT

## 2020-03-12 PROCEDURE — 99204 OFFICE O/P NEW MOD 45 MIN: CPT | Mod: 25

## 2020-03-12 PROCEDURE — 80053 COMPREHEN METABOLIC PANEL: CPT

## 2020-03-12 PROCEDURE — 99220: CPT

## 2020-03-12 PROCEDURE — 71046 X-RAY EXAM CHEST 2 VIEWS: CPT

## 2020-03-12 PROCEDURE — 36415 COLL VENOUS BLD VENIPUNCTURE: CPT

## 2020-03-12 PROCEDURE — 84443 ASSAY THYROID STIM HORMONE: CPT

## 2020-03-12 PROCEDURE — G0378: CPT

## 2020-03-12 PROCEDURE — 99284 EMERGENCY DEPT VISIT MOD MDM: CPT | Mod: 25

## 2020-03-12 PROCEDURE — 96374 THER/PROPH/DIAG INJ IV PUSH: CPT

## 2020-03-12 PROCEDURE — 78452 HT MUSCLE IMAGE SPECT MULT: CPT | Mod: 26

## 2020-03-12 PROCEDURE — 93016 CV STRESS TEST SUPVJ ONLY: CPT

## 2020-03-12 PROCEDURE — 80061 LIPID PANEL: CPT

## 2020-03-12 PROCEDURE — 78452 HT MUSCLE IMAGE SPECT MULT: CPT

## 2020-03-12 PROCEDURE — 83036 HEMOGLOBIN GLYCOSYLATED A1C: CPT

## 2020-03-12 PROCEDURE — 93018 CV STRESS TEST I&R ONLY: CPT

## 2020-03-12 PROCEDURE — 93017 CV STRESS TEST TRACING ONLY: CPT

## 2020-03-12 PROCEDURE — 93306 TTE W/DOPPLER COMPLETE: CPT | Mod: 26,59

## 2020-03-12 PROCEDURE — 93306 TTE W/DOPPLER COMPLETE: CPT

## 2020-03-12 RX ORDER — LISINOPRIL 2.5 MG/1
5 TABLET ORAL DAILY
Refills: 0 | Status: DISCONTINUED | OUTPATIENT
Start: 2020-03-12 | End: 2020-03-15

## 2020-03-12 RX ORDER — ASPIRIN/CALCIUM CARB/MAGNESIUM 324 MG
81 TABLET ORAL DAILY
Refills: 0 | Status: DISCONTINUED | OUTPATIENT
Start: 2020-03-12 | End: 2020-03-15

## 2020-03-12 RX ORDER — ATORVASTATIN CALCIUM 80 MG/1
1 TABLET, FILM COATED ORAL
Qty: 0 | Refills: 0 | DISCHARGE
Start: 2020-03-12

## 2020-03-12 RX ORDER — LISINOPRIL 2.5 MG/1
1 TABLET ORAL
Qty: 30 | Refills: 0
Start: 2020-03-12 | End: 2020-04-10

## 2020-03-12 RX ORDER — METOPROLOL TARTRATE 50 MG
25 TABLET ORAL DAILY
Refills: 0 | Status: DISCONTINUED | OUTPATIENT
Start: 2020-03-12 | End: 2020-03-15

## 2020-03-12 RX ORDER — ASPIRIN/CALCIUM CARB/MAGNESIUM 324 MG
1 TABLET ORAL
Qty: 60 | Refills: 0
Start: 2020-03-12 | End: 2020-05-10

## 2020-03-12 RX ORDER — ASPIRIN/CALCIUM CARB/MAGNESIUM 324 MG
1 TABLET ORAL
Qty: 0 | Refills: 0 | DISCHARGE
Start: 2020-03-12

## 2020-03-12 RX ORDER — ACETAMINOPHEN 500 MG
2 TABLET ORAL
Qty: 0 | Refills: 0 | DISCHARGE
Start: 2020-03-12

## 2020-03-12 RX ORDER — ATORVASTATIN CALCIUM 80 MG/1
1 TABLET, FILM COATED ORAL
Qty: 60 | Refills: 3
Start: 2020-03-12 | End: 2020-11-06

## 2020-03-12 RX ORDER — ACETAMINOPHEN 500 MG
650 TABLET ORAL EVERY 6 HOURS
Refills: 0 | Status: DISCONTINUED | OUTPATIENT
Start: 2020-03-12 | End: 2020-03-15

## 2020-03-12 RX ORDER — ATORVASTATIN CALCIUM 80 MG/1
40 TABLET, FILM COATED ORAL AT BEDTIME
Refills: 0 | Status: DISCONTINUED | OUTPATIENT
Start: 2020-03-12 | End: 2020-03-15

## 2020-03-12 RX ORDER — CARVEDILOL PHOSPHATE 80 MG/1
1 CAPSULE, EXTENDED RELEASE ORAL
Qty: 120 | Refills: 0
Start: 2020-03-12 | End: 2020-05-10

## 2020-03-12 RX ORDER — NITROGLYCERIN 6.5 MG
0.4 CAPSULE, EXTENDED RELEASE ORAL
Refills: 0 | Status: DISCONTINUED | OUTPATIENT
Start: 2020-03-12 | End: 2020-03-15

## 2020-03-12 RX ORDER — INFLUENZA VIRUS VACCINE 15; 15; 15; 15 UG/.5ML; UG/.5ML; UG/.5ML; UG/.5ML
0.5 SUSPENSION INTRAMUSCULAR ONCE
Refills: 0 | Status: DISCONTINUED | OUTPATIENT
Start: 2020-03-12 | End: 2020-03-15

## 2020-03-12 RX ORDER — LISINOPRIL 2.5 MG/1
1 TABLET ORAL
Qty: 0 | Refills: 0 | DISCHARGE
Start: 2020-03-12

## 2020-03-12 RX ORDER — ATORVASTATIN CALCIUM 80 MG/1
20 TABLET, FILM COATED ORAL AT BEDTIME
Refills: 0 | Status: DISCONTINUED | OUTPATIENT
Start: 2020-03-12 | End: 2020-03-12

## 2020-03-12 RX ORDER — REGADENOSON 0.08 MG/ML
0.4 INJECTION, SOLUTION INTRAVENOUS ONCE
Refills: 0 | Status: DISCONTINUED | OUTPATIENT
Start: 2020-03-12 | End: 2020-03-15

## 2020-03-12 RX ORDER — METOPROLOL TARTRATE 50 MG
25 TABLET ORAL ONCE
Refills: 0 | Status: COMPLETED | OUTPATIENT
Start: 2020-03-12 | End: 2020-03-12

## 2020-03-12 RX ORDER — ASPIRIN/CALCIUM CARB/MAGNESIUM 324 MG
1 TABLET ORAL
Qty: 0 | Refills: 0 | DISCHARGE

## 2020-03-12 RX ADMIN — Medication 81 MILLIGRAM(S): at 10:34

## 2020-03-12 RX ADMIN — Medication 25 MILLIGRAM(S): at 02:24

## 2020-03-12 RX ADMIN — LISINOPRIL 5 MILLIGRAM(S): 2.5 TABLET ORAL at 10:34

## 2020-03-12 RX ADMIN — Medication 25 MILLIGRAM(S): at 05:40

## 2020-03-12 NOTE — DISCHARGE NOTE PROVIDER - HOSPITAL COURSE
45y/o male with HTN, hx of CAD, +cardiac stent, smoker, non compliant, currently on no meds, presented w/ mid sternal squeezing chest pain, secondary to ACS with negative trops x 2 and normal EKG without ST/Twave changes or bundle noted. Pt underwent Nuclear stress test with abnormal results noted; Abnormal study with primarily fixed inferior wall defect however with some ischemia identified suggesting infarction with mercedes-infarction ischemia in the inferior and inferoseptal regions. Moderate LV dysfunction with ejection fraction of 38%. ECHO was done with noted; Left ventricular ejection fraction, by visual estimation, is 55 to 60%.  Normal global left ventricular systolic function. Spectral Doppler shows impaired relaxation pattern of left ventricular myocardial filling (Grade I diastolic dysfunction). There is moderate concentric left ventricular hypertrophy. Mild thickening and calcification of the anterior and posterior mitral valve leaflets. Cardiology evaluation and recommendations for cardiac cath today, however, pt adamant about leaving AMA. Extensive time spent by myself as well as cardiology to persuade undergoing LHC. Pt still wanting to leave AMA.    Pt noted with noncompliance with medications and follow up. Educated pt on med compliance and follow up. Provided pt with phone number to Family medicine clinic for close follow up. Educated pt on medication he needs to take.     AMA form filled. Pt understands the risk.     Pt is being discharged with ASA/Atorvastatin/Lisinopril/Coreg

## 2020-03-12 NOTE — H&P ADULT - NEUROLOGICAL DETAILS
responds to pain/sensation intact/alert and oriented x 3/responds to verbal commands/deep reflexes intact/cranial nerves intact

## 2020-03-12 NOTE — H&P ADULT - ASSESSMENT
43y/o male with HTN, hx of CAD, +cardiac stent, smoker, non compliant, currently on no meds, presents w/ mid sternal squeezing chest pain, lasted for 4-5 min, r/o ischemia.    Observation-telemetry  Trend trops  Echo  Resume ASA 81 mg/day, Metoprolol Succ 25 mg/day  Smoking cessation  Cardio eval- Dr Caballero 43y/o male with HTN, hx of CAD, +cardiac stent, smoker, non compliant, currently on no meds, presents w/ mid sternal squeezing chest pain, lasted for 4-5 min, r/o ischemia.    Observation-telemetry  Trend trops  Echo  Resume ASA 81 mg/day, Metoprolol Succ 25 mg/day  Nitro prn for chest pain  Smoking cessation  Cardio eval- Dr Caballero  Stress test  Low risk for DVT-does not require pharmacologic prophylaxis

## 2020-03-12 NOTE — DISCHARGE NOTE PROVIDER - NSDCMRMEDTOKEN_GEN_ALL_CORE_FT
acetaminophen 325 mg oral tablet: 2 tab(s) orally every 6 hours, As needed, Temp greater or equal to 38C (100.4F), Mild Pain (1 - 3)  aspirin 81 mg oral tablet: 1 tab(s) orally once a day  aspirin 81 mg oral tablet, chewable: 1 tab(s) orally once a day  atorvastatin 40 mg oral tablet: 1 tab(s) orally once a day (at bedtime)  atorvastatin 40 mg oral tablet: 1 tab(s) orally once a day (at bedtime)  carvedilol 3.125 mg oral tablet: 1 tab(s) orally 2 times a day   lisinopril 5 mg oral tablet: 1 tab(s) orally once a day  lisinopril 5 mg oral tablet: 1 tab(s) orally once a day

## 2020-03-12 NOTE — CONSULT NOTE ADULT - ATTENDING COMMENTS
chest pains  uncelar etiollgy would consider paitent for nucelar stress tesging. procs and cons of nuclear stesgin plaint treadill cardaic cta and cat discussed n detail inclding a ssnesdityivty ansylsy. nucles stress eping will request a vreeiwo of aparcrio cath rpeot. paiten instutced tostop smoking and alhcohl and told of advese effects on the haet. encougard to maint aint asa given a prior cardiac stetnt. chest pains  unclear etiology would consider patient for nuclear stress testing. pros and cons of nuclear testing plain treadmill cardiac cta and cat discussed n detail including a sensitivity analysis. nuclear stress testing will request a review of a prior cath report. patient instructed to stop smoking and drinking alcohol and told of adverse effects on the heat. encouraged to maintain asa given a prior cardiac stent.

## 2020-03-12 NOTE — PATIENT PROFILE ADULT - FUNCTIONAL SCREEN CURRENT LEVEL: SWALLOWING (IF SCORE 2 OR MORE FOR ANY ITEM, CONSULT REHAB SERVICES), MLM)
wound check in 1week in the office no sex nothing in vagina no heavy lifting no pushing no straining no strenuous activities  pain medication as needed; stool softener; dulcolax as needed if constipated  walk for exercise: helps recovery   continue prenatal vitamins daily especially whole course of breastfeeding  see your OB in the office for follow up post partum check call the office set up appointment in 1-2weeks  clean wound daily with soap and water; please note wound for redness or swelling; if noted go to the office right away so the doctor can evaluate the wound for possible wound infection iron 3xday 0 = swallows foods/liquids without difficulty

## 2020-03-12 NOTE — CONSULT NOTE ADULT - SUBJECTIVE AND OBJECTIVE BOX
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  Pt states it lasted for about 4-5 min.  Pt was given ASA in the ED.  Pt denies fever, chills, cough, abd pain.  Pt states he has not been on his meds for >6 months, because of insurance issues.  EKG NSR 90/min.    HPI:    PMH:   MI (myocardial infarction)  Essential hypertension  Smoker    PSH:   H/O right coronary artery stent placement  No significant past surgical history    Family History:  FAMILY HISTORY:  CAD (coronary artery disease): mother had CABG ()      Social History:  Smoking:  Alcohol:  Drugs:    Allergies:  No Known Allergies      Medications:  acetaminophen   Tablet .. 650 milliGRAM(s) Oral every 6 hours PRN  aspirin  chewable 81 milliGRAM(s) Oral daily  atorvastatin 40 milliGRAM(s) Oral at bedtime  influenza   Vaccine 0.5 milliLiter(s) IntraMuscular once  lisinopril 5 milliGRAM(s) Oral daily  metoprolol succinate ER 25 milliGRAM(s) Oral daily  nitroglycerin     SubLingual 0.4 milliGRAM(s) SubLingual every 5 minutes PRN  regadenoson Injectable 0.4 milliGRAM(s) IV Push once      REVIEW OF SYSTEMS:  CONSTITUTIONAL: No fever, weight loss, or fatigue  EYES: No eye pain, visual disturbances, or discharge  ENMT:  No difficulty hearing, tinnitus, vertigo; No sinus or throat pain  NECK: No pain or stiffness  BREASTS: No pain, masses, or nipple discharge  RESPIRATORY: No cough, wheezing, chills or hemoptysis; No shortness of breath  CARDIOVASCULAR: No chest pain, palpitations, dizziness, or leg swelling  GASTROINTESTINAL: No abdominal or epigastric pain. No nausea, vomiting, or hematemesis; No diarrhea or constipation. No melena or hematochezia.  GENITOURINARY: No dysuria, frequency, hematuria, or incontinence  NEUROLOGICAL: No headaches, memory loss, loss of strength, numbness, or tremors  SKIN: No itching, burning, rashes, or lesions   LYMPH NODES: No enlarged glands  ENDOCRINE: No heat or cold intolerance; No hair loss  MUSCULOSKELETAL: No joint pain or swelling; No muscle, back, or extremity pain  PSYCHIATRIC: No depression, anxiety, mood swings, or difficulty sleeping  HEME/LYMPH: No easy bruising, or bleeding gums  ALLERY AND IMMUNOLOGIC: No hives or eczema    Physical Exam:  T(C): 36.7 (20 @ 10:22), Max: 36.9 (20 @ 00:52)  HR: 77 (20 @ 10:22) (73 - 97)  BP: 169/101 (20 @ 10:22) (148/103 - 185/109)  RR: 16 (20 @ 10:22) (16 - 19)  SpO2: 99% (20 @ 10:22) (99% - 99%)  Wt(kg): --    GENERAL: NAD, well-groomed, well-developed  HEAD:  Atraumatic, Normocephalic  EYES: EOMI, conjunctiva and sclera clear  ENT: Moist mucous membranes,  NECK: Supple, No JVD, no bruits  CHEST/LUNG: Clear to percussion bilaterally; No rales, rhonchi, wheezing, or rubs  HEART: Regular rate and rhythm; No murmurs, rubs, or gallops PMI non displaced.  ABDOMEN: Soft, Nontender, Nondistended; Bowel sounds present  EXTREMITIES:  2+ Peripheral Pulses, No clubbing, cyanosis, or edema  SKIN: No rashes or lesions  NERVOUS SYSTEM:  Cranial Nerves II-XII intact     Cardiovascular Diagnostic Testing:  ECG:      < from: 12 Lead ECG (20 @ 22:05) >  Diagnosis Line Normal sinus rhythm  Minimal voltage criteria for LVH, may be normal variant  Nonspecific T wave abnormality  Clockwise rotation  Abnormal ECG  When compared with ECG of 15-MAY-2019 05:23,  Nonspecific T wave abnormality no longer evident in Anterior leads    Confirmed by JAMIE DORSEY MD () on 3/12/2020 9:08:50 AM    < end of copied text >    ECHO:    < from: TTE Echo Complete w/o contrast w/ Doppler (20 @ 07:58) >    Summary:   1. Left ventricular ejection fraction, by visual estimation, is 55 to 60%.   2. Normal global left ventricular systolic function.   3. Spectral Doppler shows impaired relaxation pattern of left ventricular myocardial filling (Grade I diastolic dysfunction).   4. There is moderate concentric left ventricular hypertrophy.   5. Mild thickening and calcification of the anterior and posterior mitral valve leaflets.    [id number] Jamie Dorsey , Electronically signed on 3/12/2020 at 11:15:28 AM         *** Final ***            JAMIE DORSEY   This document has been electronically signed. Mar 12 2020  7:58AM    < end of copied text >      Labs:                        13.6   8.30  )-----------( 344      ( 11 Mar 2020 22:21 )             39.7         140  |  105  |  8   ----------------------------<  120<H>  4.2   |  29  |  1.11    Ca    9.0      11 Mar 2020 22:21    TPro  7.8  /  Alb  3.5  /  TBili  0.1<L>  /  DBili  x   /  AST  19  /  ALT  39  /  AlkPhos  127<H>  03-11      CARDIAC MARKERS ( 12 Mar 2020 05:30 )  <.017 ng/mL / x     / x     / x     / x      CARDIAC MARKERS ( 11 Mar 2020 22:21 )  <.017 ng/mL / x     / x     / x     / x            Total Cholesterol: 246  LDL: 184  HDL: 29  T    Hemoglobin A1C, Whole Blood: 5.7 % ( @ 09:30)    Thyroid Stimulating Hormone, Serum: 1.78 uIU/mL ( @ 09:30)      Imaging: Chief Complaint:     45y/o male with HTN, hx of CAD, +cardiac stent, smoker, non compliant, currently on no meds, presents to the ED w/ c/o mid sternal squeezing chest pain  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  Pt states it lasted for about 4-5 min.  Pt was given ASA in the ED.  Pt denies fever, chills, cough, abd pain.  Pt states he has not been on his meds for >6 months, because of insurance issues.  EKG NSR 90/min.    HPI:    PMH:   MI (myocardial infarction)  Essential hypertension  Smoker    PSH:   H/O right coronary artery stent placement 10/20/15 ef 35 mid rca .99 stent  No significant past surgical history    Family History:  FAMILY HISTORY:  CAD (coronary artery disease): mother had CABG ()      Social History:  Smoking:  Alcohol:  Drugs:    Allergies:  No Known Allergies      Medications:  acetaminophen   Tablet .. 650 milliGRAM(s) Oral every 6 hours PRN  aspirin  chewable 81 milliGRAM(s) Oral daily  atorvastatin 40 milliGRAM(s) Oral at bedtime  influenza   Vaccine 0.5 milliLiter(s) IntraMuscular once  lisinopril 5 milliGRAM(s) Oral daily  metoprolol succinate ER 25 milliGRAM(s) Oral daily  nitroglycerin     SubLingual 0.4 milliGRAM(s) SubLingual every 5 minutes PRN  regadenoson Injectable 0.4 milliGRAM(s) IV Push once      REVIEW OF SYSTEMS:  CONSTITUTIONAL: No fever, weight loss, or fatigue  EYES: No eye pain, visual disturbances, or discharge  ENMT:  No difficulty hearing, tinnitus, vertigo; No sinus or throat pain  NECK: No pain or stiffness  BREASTS: No pain, masses, or nipple discharge  RESPIRATORY: No cough, wheezing, chills or hemoptysis; No shortness of breath  CARDIOVASCULAR: No chest pain, palpitations, dizziness, or leg swelling  GASTROINTESTINAL: No abdominal or epigastric pain. No nausea, vomiting, or hematemesis; No diarrhea or constipation. No melena or hematochezia.  GENITOURINARY: No dysuria, frequency, hematuria, or incontinence  NEUROLOGICAL: No headaches, memory loss, loss of strength, numbness, or tremors  SKIN: No itching, burning, rashes, or lesions   LYMPH NODES: No enlarged glands  ENDOCRINE: No heat or cold intolerance; No hair loss  MUSCULOSKELETAL: No joint pain or swelling; No muscle, back, or extremity pain  PSYCHIATRIC: No depression, anxiety, mood swings, or difficulty sleeping  HEME/LYMPH: No easy bruising, or bleeding gums  ALLERY AND IMMUNOLOGIC: No hives or eczema    Physical Exam:  T(C): 36.7 (20 @ 10:22), Max: 36.9 (20 @ 00:52)  HR: 77 (20 @ 10:22) (73 - 97)  BP: 169/101 (20 @ 10:22) (148/103 - 185/109)  RR: 16 (20 @ 10:22) (16 - 19)  SpO2: 99% (20 @ 10:22) (99% - 99%)  Wt(kg): --    GENERAL: NAD, well-groomed, well-developed  HEAD:  Atraumatic, Normocephalic  EYES: EOMI, conjunctiva and sclera clear  ENT: Moist mucous membranes,  NECK: Supple, No JVD, no bruits  CHEST/LUNG: Clear to percussion bilaterally; No rales, rhonchi, wheezing, or rubs  HEART: Regular rate and rhythm; No murmurs, rubs, or gallops PMI non displaced.  ABDOMEN: Soft, Nontender, Nondistended; Bowel sounds present  EXTREMITIES:  2+ Peripheral Pulses, No clubbing, cyanosis, or edema  SKIN: No rashes or lesions  NERVOUS SYSTEM:  Cranial Nerves II-XII intact     Cardiovascular Diagnostic Testing:  ECG:      < from: 12 Lead ECG (20 @ 22:05) >  Diagnosis Line Normal sinus rhythm  Minimal voltage criteria for LVH, may be normal variant  Nonspecific T wave abnormality  Clockwise rotation  Abnormal ECG  When compared with ECG of 15-MAY-2019 05:23,  Nonspecific T wave abnormality no longer evident in Anterior leads    Confirmed by JAMIE DORSEY MD () on 3/12/2020 9:08:50 AM    < end of copied text >    ECHO:    < from: TTE Echo Complete w/o contrast w/ Doppler (20 @ 07:58) >    Summary:   1. Left ventricular ejection fraction, by visual estimation, is 55 to 60%.   2. Normal global left ventricular systolic function.   3. Spectral Doppler shows impaired relaxation pattern of left ventricular myocardial filling (Grade I diastolic dysfunction).   4. There is moderate concentric left ventricular hypertrophy.   5. Mild thickening and calcification of the anterior and posterior mitral valve leaflets.    [id number] Jamie Dorsey , Electronically signed on 3/12/2020 at 11:15:28 AM         *** Final ***            JAMIE DORSEY   This document has been electronically signed. Mar 12 2020  7:58AM    < end of copied text >      Labs:                        13.6   8.30  )-----------( 344      ( 11 Mar 2020 22:21 )             39.7     03    140  |  105  |  8   ----------------------------<  120<H>  4.2   |  29  |  1.11    Ca    9.0      11 Mar 2020 22:21    TPro  7.8  /  Alb  3.5  /  TBili  0.1<L>  /  DBili  x   /  AST  19  /  ALT  39  /  AlkPhos  127<H>  0311      CARDIAC MARKERS ( 12 Mar 2020 05:30 )  <.017 ng/mL / x     / x     / x     / x      CARDIAC MARKERS ( 11 Mar 2020 22:21 )  <.017 ng/mL / x     / x     / x     / x            Total Cholesterol: 246  LDL: 184  HDL: 29  T    Hemoglobin A1C, Whole Blood: 5.7 % ( @ 09:30)    Thyroid Stimulating Hormone, Serum: 1.78 uIU/mL ( @ 09:30)      Imaging:

## 2020-03-12 NOTE — DISCHARGE NOTE PROVIDER - NSDCCPCAREPLAN_GEN_ALL_CORE_FT
PRINCIPAL DISCHARGE DIAGNOSIS  Diagnosis: ACS (acute coronary syndrome)  Assessment and Plan of Treatment: Recommendation for cardiac cath, leaving AMA      SECONDARY DISCHARGE DIAGNOSES  Diagnosis: CAD (coronary artery disease)  Assessment and Plan of Treatment:

## 2020-03-12 NOTE — H&P ADULT - NSHPPHYSICALEXAM_GEN_ALL_CORE
Vital Signs (24 Hrs):  T(C): 36.9 (03-12-20 @ 00:52), Max: 36.9 (03-12-20 @ 00:52)  HR: 81 (03-12-20 @ 00:52) (81 - 97)  BP: 165/105 (03-12-20 @ 00:52) (160/101 - 185/109)  RR: 19 (03-12-20 @ 00:52) (17 - 19)  SpO2: 99% (03-12-20 @ 00:52) (99% - 99%)  Daily Height in cm: 172.72 (12 Mar 2020 00:52)

## 2020-03-12 NOTE — PROGRESS NOTE ADULT - SUBJECTIVE AND OBJECTIVE BOX
Adan Pantoja M.D. Pager Number [phone]    Patient is a 44y old  Male who presents with a chief complaint of chest pain (12 Mar 2020 00:06)      SUBJECTIVE / OVERNIGHT EVENTS:  Pt seen and examined at bedside. No acute events overnight. [facility] had similar episode 4 yrs ago, had a stent placed. Has not followed up with any physician. Has been returning to ED for refills of meds but has been off of meds for over a year. [facility] was seen 1 year ago with similar complaints of substernal chest pain. Underwent testing which was negative.  Reason for noncompliance to meds and outpatient follow up due to lack of insurance, although [facility] he is a manager at [employer].   Active smoker, [facility] quit yesterday.   Pt denies cp, palpitations, sob, abd pain, N/V, fever, chills.    ROS:  All other review of systems negative    Allergies    No Known Allergies    Intolerances        MEDICATIONS  (STANDING):  aspirin  chewable 81 milliGRAM(s) Oral daily  atorvastatin 20 milliGRAM(s) Oral at bedtime  influenza   Vaccine 0.5 milliLiter(s) IntraMuscular once  metoprolol succinate ER 25 milliGRAM(s) Oral daily  regadenoson Injectable 0.4 milliGRAM(s) IV Push once    MEDICATIONS  (PRN):  acetaminophen   Tablet .. 650 milliGRAM(s) Oral every 6 hours PRN Temp greater or equal to 38C (100.4F), Mild Pain (1 - 3)  nitroglycerin     SubLingual 0.4 milliGRAM(s) SubLingual every 5 minutes PRN Chest Pain      Vital Signs Last 24 Hrs  T(C): 36.4 (12 Mar 2020 05:54), Max: 36.9 (12 Mar 2020 00:52)  T(F): 97.5 (12 Mar 2020 05:54), Max: 98.5 (12 Mar 2020 00:52)  HR: 79 (12 Mar 2020 07:30) (73 - 97)  BP: 159/87 (12 Mar 2020 07:30) (148/103 - 185/109)  BP(mean): 104 (12 Mar 2020 07:30) (104 - 104)  RR: 18 (12 Mar 2020 05:54) (17 - 19)  SpO2: 99% (12 Mar 2020 05:54) (99% - 99%)  CAPILLARY BLOOD GLUCOSE        I&O's Summary      PHYSICAL EXAM:  GENERAL: NAD, overweight male   HEAD:  Atraumatic, Normocephalic  EYES: EOMI, PERRLA, conjunctiva and sclera clear  NECK: Supple, No JVD  CHEST/LUNG: Clear to auscultation bilaterally; No wheeze  HEART: Regular rate and rhythm; No murmurs, rubs, or gallops  ABDOMEN: Soft, Nontender, Nondistended; Bowel sounds present  EXTREMITIES:  2+ Peripheral Pulses, No clubbing, cyanosis, or edema  NEUROLOGY: AAOx3, non-focal  PSYCH: calm  SKIN: No rashes or lesions    LABS:                        13.6   8.30  )-----------( 344      ( 11 Mar 2020 22:21 )             39.7     03-11    140  |  105  |  8   ----------------------------<  120<H>  4.2   |  29  |  1.11    Ca    9.0      11 Mar 2020 22:21    TPro  7.8  /  Alb  3.5  /  TBili  0.1<L>  /  DBili  x   /  AST  19  /  ALT  39  /  AlkPhos  127<H>  03-11      CARDIAC MARKERS ( 12 Mar 2020 05:30 )  <.017 ng/mL / x     / x     / x     / x      CARDIAC MARKERS ( 11 Mar 2020 22:21 )  <.017 ng/mL / x     / x     / x     / x              RADIOLOGY & ADDITIONAL TESTS:  Results Reviewed:   Imaging Personally Reviewed:  Electrocardiogram Personally Reviewed:    COORDINATION OF CARE:  Care Discussed with Consultants/Other Providers [Y/N]:  Prior or Outpatient Records Reviewed [Y/N]:

## 2020-03-12 NOTE — PROGRESS NOTE ADULT - ASSESSMENT
43y/o male with HTN, hx of CAD, +cardiac stent, smoker, non compliant, currently on no meds, presents w/ mid sternal squeezing chest pain, lasted for 4-5 min, r/o ischemia.    #Chest pain  -R/o out ACS  -Trops negative x 2  -EKG without ST/Twave changes. NSR. No bundle noted  -Continue ASA/Metoprolol/Statin  -Follow up with ECHO  -Follow up with nuclear stress test  -Cardiology follow up   -Monitor tele  -Follow up with 3rd Trop and HA1C levels    #CAD  -S/p one mid RCA stent in 10/2015. Underwent Nuclear Stress test in 2019 which was negative  -Continue ASA/Metoprolol/Statin  -Follow up with Cardiology 43y/o male with HTN, hx of CAD, +cardiac stent, smoker, non compliant, currently on no meds, presents w/ mid sternal squeezing chest pain, lasted for 4-5 min, r/o ischemia.    #Chest pain  -R/o out ACS  -Trops negative x 2  -EKG without ST/Twave changes. NSR. No bundle noted  -Continue ASA/Metoprolol/Statin  -Follow up with ECHO  -Follow up with nuclear stress test  -Cardiology follow up   -Monitor tele  -Follow up with 3rd Trop and HA1C levels    #CAD  -S/p one mid RCA stent in 10/2015. Underwent Nuclear Stress test in 2019 which was negative  -Continue ASA/Metoprolol/Statin  -Follow up with Cardiology    #HTN  -Chronic uncontrolled secondary to noncompliance to meds and follow up/monitor  -Continue Metoprolol  -Restart Lisinopril 5mg daily  -Monitor vitals and titrate meds as beneficial

## 2020-03-12 NOTE — DISCHARGE NOTE NURSING/CASE MANAGEMENT/SOCIAL WORK - PATIENT PORTAL LINK FT
You can access the FollowMyHealth Patient Portal offered by [facility] by registering at the following website: [email or web address]. By joining [facility]’s FollowMyHealth portal, you will also be able to view your health information using other applications (apps) compatible with our system.

## 2020-03-12 NOTE — DISCHARGE NOTE PROVIDER - NSFOLLOWUPCLINICS_GEN_ALL_ED_FT
[facility] Practice [facility]  Family Medicine  [address] [place] [id number]  Phone: [phone]  Fax:   Follow Up Time: 1-3 Days

## 2020-03-12 NOTE — H&P ADULT - HISTORY OF PRESENT ILLNESS
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  Pt states it lasted for lasted few minutes. no pain now.  no leg pain/swelling /recent periods of immobility.  pain started after smoking. no fever. had mild cough, runny nose last few days. has not taking any meds (for BP, asa) due to lack of insurance. 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  Pt states it lasted for about 4-5 min.  Pt was given ASA in the ED.  Pt denies fever, chills, cough, abd pain.  Pt states he has not been on his meds for >6 months, because of insurance issues.    EKG NSR 90/min.

## 2020-03-22 ENCOUNTER — EMERGENCY (EMERGENCY)
Facility: HOSPITAL | Age: 45
LOS: 1 days | Discharge: HOME | End: 2020-03-22
Attending: EMERGENCY MEDICINE | Admitting: EMERGENCY MEDICINE
Payer: MEDICAID

## 2020-03-22 VITALS
SYSTOLIC BLOOD PRESSURE: 162 MMHG | OXYGEN SATURATION: 99 % | DIASTOLIC BLOOD PRESSURE: 128 MMHG | TEMPERATURE: 100 F | WEIGHT: 250 LBS | HEART RATE: 88 BPM | RESPIRATION RATE: 18 BRPM

## 2020-03-22 VITALS — HEART RATE: 84 BPM | DIASTOLIC BLOOD PRESSURE: 97 MMHG | SYSTOLIC BLOOD PRESSURE: 160 MMHG

## 2020-03-22 DIAGNOSIS — R50.9 FEVER, UNSPECIFIED: ICD-10-CM

## 2020-03-22 DIAGNOSIS — Z95.5 PRESENCE OF CORONARY ANGIOPLASTY IMPLANT AND GRAFT: Chronic | ICD-10-CM

## 2020-03-22 LAB
APPEARANCE UR: CLEAR — SIGNIFICANT CHANGE UP
BACTERIA # UR AUTO: NEGATIVE /HPF — SIGNIFICANT CHANGE UP
BILIRUB UR-MCNC: NEGATIVE — SIGNIFICANT CHANGE UP
COLOR SPEC: YELLOW — SIGNIFICANT CHANGE UP
DIFF PNL FLD: ABNORMAL
EPI CELLS # UR: SIGNIFICANT CHANGE UP
GLUCOSE UR QL: NEGATIVE — SIGNIFICANT CHANGE UP
KETONES UR-MCNC: NEGATIVE — SIGNIFICANT CHANGE UP
LEUKOCYTE ESTERASE UR-ACNC: NEGATIVE — SIGNIFICANT CHANGE UP
NITRITE UR-MCNC: NEGATIVE — SIGNIFICANT CHANGE UP
PH UR: 5 — SIGNIFICANT CHANGE UP (ref 5–8)
PROT UR-MCNC: 100
RBC CASTS # UR COMP ASSIST: SIGNIFICANT CHANGE UP /HPF (ref 0–4)
SP GR SPEC: 1.02 — SIGNIFICANT CHANGE UP (ref 1.01–1.02)
UROBILINOGEN FLD QL: NEGATIVE — SIGNIFICANT CHANGE UP
WBC UR QL: SIGNIFICANT CHANGE UP /HPF (ref 0–5)

## 2020-03-22 PROCEDURE — 87086 URINE CULTURE/COLONY COUNT: CPT

## 2020-03-22 PROCEDURE — 71045 X-RAY EXAM CHEST 1 VIEW: CPT | Mod: 26

## 2020-03-22 PROCEDURE — 99283 EMERGENCY DEPT VISIT LOW MDM: CPT

## 2020-03-22 PROCEDURE — 99284 EMERGENCY DEPT VISIT MOD MDM: CPT

## 2020-03-22 PROCEDURE — 71045 X-RAY EXAM CHEST 1 VIEW: CPT

## 2020-03-22 PROCEDURE — 81001 URINALYSIS AUTO W/SCOPE: CPT

## 2020-03-22 PROCEDURE — 99220: CPT

## 2020-03-22 NOTE — ED PROVIDER NOTE - OBJECTIVE STATEMENT
pt c/o mild low back pain for last 2 days, also c/o intermittent cough, subjective fever for last two days as well, fever and back pain resolve when taking ibuprofen.  no h/o cancer, being worked up as outpatient for ACS.

## 2020-03-22 NOTE — ED PROVIDER NOTE - PATIENT PORTAL LINK FT
You can access the FollowMyHealth Patient Portal offered by [facility] by registering at the following website: [email or web address]. By joining [employer] [facility]’s FollowMyHealth portal, you will also be able to view your health information using other applications (apps) compatible with our system.

## 2020-03-22 NOTE — ED ADULT NURSE NOTE - OBJECTIVE STATEMENT
Pt presents to ED awake, alert complaining of lower back pain and fever that began last night. Denies any urinary symptoms. Breathing is regular and non-labored at this time.

## 2020-03-22 NOTE — ED PROVIDER NOTE - NSFOLLOWUPINSTRUCTIONS_ED_ALL_ED_FT
**********  PLEASE QUARANTINE YOURSELF FOR 14 DAYS   ***********************    *****************  YOU ARE CLEARED IF YOU DO NOT HAVE FEVER OR COUGH FOR 3 DAYS WITHOUT TAKING ANY MEDICATION FOR FEVER   *****************************      -- Follow up with your primary care physician in 48 hours.    -- Return to the ER for worsening or persistent symptoms, and/or ANY NEW OR CONCERNING SYMPTOMS.    -- If you have difficulty following up, please call: [phone] to obtain a [facility] doctor or specialist who takes your insurance in your area.

## 2020-03-24 LAB
CULTURE RESULTS: SIGNIFICANT CHANGE UP
SPECIMEN SOURCE: SIGNIFICANT CHANGE UP

## 2020-05-19 NOTE — ED ADULT NURSE NOTE - ALCOHOL PRE SCREEN (AUDIT - C)
Received medical records from Dr. Xi Jacobs. Was diagnosed with possible endometriosis and Hx of tubal disease with a history of an ectopic pregnancy. Will do more testing, including HSG to evaluate the tubal anatomy, ultrasound to evaluate the ovaries and uterus, along with basic ovarian function labs and prescreening lbas.  will have a semen analysis, infectious disease testing and then will return for a telehealth consultation. Statement Selected

## 2020-06-02 ENCOUNTER — EMERGENCY (EMERGENCY)
Facility: HOSPITAL | Age: 45
LOS: 1 days | Discharge: HOME | End: 2020-06-02
Attending: EMERGENCY MEDICINE | Admitting: EMERGENCY MEDICINE
Payer: MEDICAID

## 2020-06-02 VITALS
HEART RATE: 87 BPM | TEMPERATURE: 98 F | OXYGEN SATURATION: 97 % | DIASTOLIC BLOOD PRESSURE: 89 MMHG | HEIGHT: 69 IN | RESPIRATION RATE: 18 BRPM | WEIGHT: 229.94 LBS | SYSTOLIC BLOOD PRESSURE: 149 MMHG

## 2020-06-02 DIAGNOSIS — Z95.5 PRESENCE OF CORONARY ANGIOPLASTY IMPLANT AND GRAFT: Chronic | ICD-10-CM

## 2020-06-02 DIAGNOSIS — R20.0 ANESTHESIA OF SKIN: ICD-10-CM

## 2020-06-02 PROCEDURE — 99285 EMERGENCY DEPT VISIT HI MDM: CPT

## 2020-06-02 PROCEDURE — 93010 ELECTROCARDIOGRAM REPORT: CPT

## 2020-06-03 VITALS
HEART RATE: 82 BPM | DIASTOLIC BLOOD PRESSURE: 62 MMHG | SYSTOLIC BLOOD PRESSURE: 109 MMHG | RESPIRATION RATE: 18 BRPM | OXYGEN SATURATION: 100 %

## 2020-06-03 LAB
ALBUMIN SERPL ELPH-MCNC: 3.5 G/DL — SIGNIFICANT CHANGE UP (ref 3.3–5)
ALP SERPL-CCNC: 133 U/L — HIGH (ref 40–120)
ALT FLD-CCNC: 38 U/L — SIGNIFICANT CHANGE UP (ref 10–45)
ANION GAP SERPL CALC-SCNC: 10 MMOL/L — SIGNIFICANT CHANGE UP (ref 5–17)
AST SERPL-CCNC: 25 U/L — SIGNIFICANT CHANGE UP (ref 10–40)
BILIRUB SERPL-MCNC: 0.2 MG/DL — SIGNIFICANT CHANGE UP (ref 0.2–1.2)
BUN SERPL-MCNC: 17 MG/DL — SIGNIFICANT CHANGE UP (ref 7–23)
CALCIUM SERPL-MCNC: 9.1 MG/DL — SIGNIFICANT CHANGE UP (ref 8.4–10.5)
CHLORIDE SERPL-SCNC: 103 MMOL/L — SIGNIFICANT CHANGE UP (ref 96–108)
CO2 SERPL-SCNC: 26 MMOL/L — SIGNIFICANT CHANGE UP (ref 22–31)
CREAT SERPL-MCNC: 1.29 MG/DL — SIGNIFICANT CHANGE UP (ref 0.5–1.3)
GLUCOSE SERPL-MCNC: 119 MG/DL — HIGH (ref 70–99)
HCT VFR BLD CALC: 38.1 % — LOW (ref 39–50)
HGB BLD-MCNC: 12.7 G/DL — LOW (ref 13–17)
MAGNESIUM SERPL-MCNC: 2 MG/DL — SIGNIFICANT CHANGE UP (ref 1.6–2.6)
MCHC RBC-ENTMCNC: 28 PG — SIGNIFICANT CHANGE UP (ref 27–34)
MCHC RBC-ENTMCNC: 33.3 GM/DL — SIGNIFICANT CHANGE UP (ref 32–36)
MCV RBC AUTO: 84.1 FL — SIGNIFICANT CHANGE UP (ref 80–100)
NRBC # BLD: 0 /100 WBCS — SIGNIFICANT CHANGE UP (ref 0–0)
PLATELET # BLD AUTO: 330 K/UL — SIGNIFICANT CHANGE UP (ref 150–400)
POTASSIUM SERPL-MCNC: 4 MMOL/L — SIGNIFICANT CHANGE UP (ref 3.5–5.3)
POTASSIUM SERPL-SCNC: 4 MMOL/L — SIGNIFICANT CHANGE UP (ref 3.5–5.3)
PROT SERPL-MCNC: 7.7 G/DL — SIGNIFICANT CHANGE UP (ref 6–8.3)
RBC # BLD: 4.53 M/UL — SIGNIFICANT CHANGE UP (ref 4.2–5.8)
RBC # FLD: 13.4 % — SIGNIFICANT CHANGE UP (ref 10.3–14.5)
SODIUM SERPL-SCNC: 139 MMOL/L — SIGNIFICANT CHANGE UP (ref 135–145)
TROPONIN I SERPL-MCNC: <.017 NG/ML — LOW (ref 0.02–0.06)
WBC # BLD: 7.7 K/UL — SIGNIFICANT CHANGE UP (ref 3.8–10.5)
WBC # FLD AUTO: 7.7 K/UL — SIGNIFICANT CHANGE UP (ref 3.8–10.5)

## 2020-06-03 PROCEDURE — 80053 COMPREHEN METABOLIC PANEL: CPT

## 2020-06-03 PROCEDURE — 36415 COLL VENOUS BLD VENIPUNCTURE: CPT

## 2020-06-03 PROCEDURE — 99284 EMERGENCY DEPT VISIT MOD MDM: CPT | Mod: 25

## 2020-06-03 PROCEDURE — 83735 ASSAY OF MAGNESIUM: CPT

## 2020-06-03 PROCEDURE — 93005 ELECTROCARDIOGRAM TRACING: CPT

## 2020-06-03 PROCEDURE — 84484 ASSAY OF TROPONIN QUANT: CPT

## 2020-06-03 PROCEDURE — 71045 X-RAY EXAM CHEST 1 VIEW: CPT

## 2020-06-03 PROCEDURE — 71045 X-RAY EXAM CHEST 1 VIEW: CPT | Mod: 26

## 2020-06-03 PROCEDURE — 85027 COMPLETE CBC AUTOMATED: CPT

## 2020-06-03 RX ORDER — IBUPROFEN 200 MG
600 TABLET ORAL ONCE
Refills: 0 | Status: COMPLETED | OUTPATIENT
Start: 2020-06-03 | End: 2020-06-03

## 2020-06-03 RX ADMIN — Medication 600 MILLIGRAM(S): at 01:23

## 2020-06-03 NOTE — ED PROVIDER NOTE - OBJECTIVE STATEMENT
pt c/o numbness and tingling , moderate, to bilat arms, intermittent for last 3 weeks, more constant today, not related to exertion. also notes some shooting pains down arms also.  no neck pain. no trauma. no arm weakness. no speech or vision changes.  no chest pain, sob, fever, cough.  no arm swelling.  has h/o RCA stent, MI.  was admitted here for ACS w/u march 2020 with abnormal stress. pt signed out ama, declined cardiac cath

## 2020-06-03 NOTE — ED ADULT NURSE NOTE - OBJECTIVE STATEMENT
Pt a&ox3 ambulatory to ED c/o bilateral arm numbness. Pt states he has been feeling numbness for the past 2 weeks but worse tonight. Pt has h/o CAD with cardiac stent x1. Pt denies cp, sob, dizziness, visual changes.

## 2020-06-03 NOTE — ED PROVIDER NOTE - PATIENT PORTAL LINK FT
You can access the FollowMyHealth Patient Portal offered by [facility] by registering at the following website: [email or web address]. By joining [facility]’s FollowMyHealth portal, you will also be able to view your health information using other applications (apps) compatible with our system.

## 2020-06-03 NOTE — ED PROVIDER NOTE - CARE PROVIDER_API CALL
Juan Gibson  CARDIOLOGY  [address] [facility] [address]  [place] [address]  Phone: [phone]  Fax: [phone]  Follow Up Time: 1-3 Days    Antonio Ontiveros  NEUROLOGY  [address] [place] [address]  Phone: [phone]  Fax: [phone]  Follow Up Time: 1-3 Days

## 2020-06-03 NOTE — ED PROVIDER NOTE - PROVIDER TOKENS
PROVIDER:[TOKEN:[[id number]],FOLLOWUP:[1-3 Days]],PROVIDER:[TOKEN:[[id number]],FOLLOWUP:[1-3 Days]]

## 2020-06-03 NOTE — ED PROVIDER NOTE - CLINICAL SUMMARY MEDICAL DECISION MAKING FREE TEXT BOX
bilat arm numbness/tingling/pain. normal nonfocal exam. EKG unremarkable, unchanged from march 2020.  bilat nature of sxs and pain not c/w CVA.  possible ACS although sxs atypical for acs.  will check labs, trop, cxr. ?radicular pain. bilat arm numbness/tingling/pain. normal nonfocal exam. EKG unremarkable, unchanged from march 2020.  bilat nature of sxs and pain not c/w CVA.  possible ACS although sxs atypical for acs.  will check labs, trop, cxr. ?radicular pain.    0110: labs, trop ekg, cxr unremarkable. VSS. sxs ongoing for 3 wks already. told pt to f/u c cardiology and neuro.  return for worsening or new sxs

## 2020-06-03 NOTE — ED PROVIDER NOTE - CARE PROVIDERS DIRECT ADDRESSES
[email or web address]

## 2020-06-03 NOTE — ED PROVIDER NOTE - NSFOLLOWUPINSTRUCTIONS_ED_ALL_ED_FT
-take advil as needed for pain  -follow up with cardiology and neurology in next 1-3 days  -return for worse symptoms, weakness, chest pain, difficult breathing or other concerns    Paresthesia    WHAT YOU NEED TO KNOW:    Paresthesia is numbness, tingling, or burning. It can happen in any part of your body, but usually occurs in your legs, feet, arms, or hands.    DISCHARGE INSTRUCTIONS:    Return to the emergency department if:     You have severe pain along with numbness and tingling.      Your legs suddenly become cold. You have trouble moving your legs, and they ache.      You have increased weakness in a part of your body.      You have uncontrolled movements.    Contact your healthcare provider or neurologist if:     Your symptoms do not improve.      You have symptoms in more than one part of your body.      You have questions or concerns about your condition or care.    Manage paresthesia:     Protect the area from injury. You may injure or burn yourself if you lose feeling in the area. Be careful when you touch anything that could be hot. Wear sturdy shoes to protect your feet. Ask about other ways to protect yourself.       Go to physical or occupational therapy if directed. Your provider may recommend therapy if you have a condition such as carpal tunnel syndrome. A physical therapist can teach you exercises to help strengthen the area or increase your ability to move it. An occupational therapist can help you find new ways to do your daily activities.      Manage health conditions that can cause paresthesia. Work with your diabetes specialist if you have uncontrolled diabetes. A dietitian or your healthcare provider can help you create a meal plan if you have low vitamin B levels. Your provider can help you manage your health if you have multiple sclerosis or you had a stroke. It is important to manage health conditions to stop paresthesia or prevent it from getting worse.    Follow up with your healthcare provider or neurologist as directed: Your healthcare provider may refer you to a specialist. Write down your questions so you remember to ask them during your visits.

## 2020-06-14 NOTE — ED ADULT NURSE NOTE - MUSCULOSKELETAL WDL
Full range of motion of upper and lower extremities, no joint tenderness/swelling. Implemented All Universal Safety Interventions:  [place] to call system. Call bell, personal items and telephone within reach. Instruct patient to call for assistance. Room bathroom lighting operational. Non-slip footwear when patient is off stretcher. Physically safe environment: no spills, clutter or unnecessary equipment. Stretcher in lowest position, wheels locked, appropriate side rails in place.

## 2021-03-16 NOTE — H&P ADULT - REASON FOR ADMISSION
Head,  normocephalic,  atraumatic,  Face,  Face within normal limits,  Ears,  External ears within normal limits,  Nose/Nasopharynx,  External nose  normal appearance,  nares patent,  no nasal discharge,  Mouth and Throat,  Oral cavity appearance normal,  Breath odor normal,  Lips,  Appearance normal
Syncope

## 2021-04-12 ENCOUNTER — EMERGENCY (EMERGENCY)
Facility: HOSPITAL | Age: 46
LOS: 1 days | Discharge: HOME | End: 2021-04-12
Attending: EMERGENCY MEDICINE | Admitting: EMERGENCY MEDICINE
Payer: MEDICAID

## 2021-04-12 VITALS
OXYGEN SATURATION: 99 % | SYSTOLIC BLOOD PRESSURE: 178 MMHG | HEIGHT: 69 IN | HEART RATE: 93 BPM | WEIGHT: 269.18 LBS | DIASTOLIC BLOOD PRESSURE: 100 MMHG | RESPIRATION RATE: 18 BRPM | TEMPERATURE: 98 F

## 2021-04-12 VITALS — DIASTOLIC BLOOD PRESSURE: 94 MMHG | SYSTOLIC BLOOD PRESSURE: 165 MMHG

## 2021-04-12 DIAGNOSIS — Z95.5 PRESENCE OF CORONARY ANGIOPLASTY IMPLANT AND GRAFT: Chronic | ICD-10-CM

## 2021-04-12 LAB
ALBUMIN SERPL ELPH-MCNC: 3.6 G/DL — SIGNIFICANT CHANGE UP (ref 3.3–5)
ALP SERPL-CCNC: 114 U/L — SIGNIFICANT CHANGE UP (ref 40–120)
ALT FLD-CCNC: 25 U/L — SIGNIFICANT CHANGE UP (ref 10–45)
ANION GAP SERPL CALC-SCNC: 7 MMOL/L — SIGNIFICANT CHANGE UP (ref 5–17)
APPEARANCE UR: ABNORMAL
AST SERPL-CCNC: 22 U/L — SIGNIFICANT CHANGE UP (ref 10–40)
BACTERIA # UR AUTO: ABNORMAL /HPF
BASOPHILS # BLD AUTO: 0.1 K/UL — SIGNIFICANT CHANGE UP (ref 0–0.2)
BASOPHILS NFR BLD AUTO: 0.5 % — SIGNIFICANT CHANGE UP (ref 0–2)
BILIRUB SERPL-MCNC: 0.6 MG/DL — SIGNIFICANT CHANGE UP (ref 0.2–1.2)
BILIRUB UR-MCNC: NEGATIVE — SIGNIFICANT CHANGE UP
BUN SERPL-MCNC: 8 MG/DL — SIGNIFICANT CHANGE UP (ref 7–23)
CALCIUM SERPL-MCNC: 9.7 MG/DL — SIGNIFICANT CHANGE UP (ref 8.4–10.5)
CHLORIDE SERPL-SCNC: 100 MMOL/L — SIGNIFICANT CHANGE UP (ref 96–108)
CO2 SERPL-SCNC: 29 MMOL/L — SIGNIFICANT CHANGE UP (ref 22–31)
COLOR SPEC: ABNORMAL
CREAT SERPL-MCNC: 1.18 MG/DL — SIGNIFICANT CHANGE UP (ref 0.5–1.3)
DIFF PNL FLD: ABNORMAL
EOSINOPHIL # BLD AUTO: 0.03 K/UL — SIGNIFICANT CHANGE UP (ref 0–0.5)
EOSINOPHIL NFR BLD AUTO: 0.1 % — SIGNIFICANT CHANGE UP (ref 0–6)
EPI CELLS # UR: SIGNIFICANT CHANGE UP
GLUCOSE SERPL-MCNC: 140 MG/DL — HIGH (ref 70–99)
GLUCOSE UR QL: NEGATIVE — SIGNIFICANT CHANGE UP
HCT VFR BLD CALC: 42.2 % — SIGNIFICANT CHANGE UP (ref 39–50)
HGB BLD-MCNC: 14.1 G/DL — SIGNIFICANT CHANGE UP (ref 13–17)
IMM GRANULOCYTES NFR BLD AUTO: 1 % — SIGNIFICANT CHANGE UP (ref 0–1.5)
KETONES UR-MCNC: NEGATIVE — SIGNIFICANT CHANGE UP
LEUKOCYTE ESTERASE UR-ACNC: ABNORMAL
LIDOCAIN IGE QN: 104 U/L — SIGNIFICANT CHANGE UP (ref 73–393)
LYMPHOCYTES # BLD AUTO: 1.6 K/UL — SIGNIFICANT CHANGE UP (ref 1–3.3)
LYMPHOCYTES # BLD AUTO: 7.2 % — LOW (ref 13–44)
MCHC RBC-ENTMCNC: 27.8 PG — SIGNIFICANT CHANGE UP (ref 27–34)
MCHC RBC-ENTMCNC: 33.4 GM/DL — SIGNIFICANT CHANGE UP (ref 32–36)
MCV RBC AUTO: 83.1 FL — SIGNIFICANT CHANGE UP (ref 80–100)
MONOCYTES # BLD AUTO: 2.03 K/UL — HIGH (ref 0–0.9)
MONOCYTES NFR BLD AUTO: 9.2 % — SIGNIFICANT CHANGE UP (ref 2–14)
NEUTROPHILS # BLD AUTO: 18.16 K/UL — HIGH (ref 1.8–7.4)
NEUTROPHILS NFR BLD AUTO: 82 % — HIGH (ref 43–77)
NITRITE UR-MCNC: NEGATIVE — SIGNIFICANT CHANGE UP
NRBC # BLD: 0 /100 WBCS — SIGNIFICANT CHANGE UP (ref 0–0)
PH UR: 6 — SIGNIFICANT CHANGE UP (ref 5–8)
PLATELET # BLD AUTO: 332 K/UL — SIGNIFICANT CHANGE UP (ref 150–400)
POTASSIUM SERPL-MCNC: 5 MMOL/L — SIGNIFICANT CHANGE UP (ref 3.5–5.3)
POTASSIUM SERPL-SCNC: 5 MMOL/L — SIGNIFICANT CHANGE UP (ref 3.5–5.3)
PROT SERPL-MCNC: 8.8 G/DL — HIGH (ref 6–8.3)
PROT UR-MCNC: 100
RBC # BLD: 5.08 M/UL — SIGNIFICANT CHANGE UP (ref 4.2–5.8)
RBC # FLD: 13.3 % — SIGNIFICANT CHANGE UP (ref 10.3–14.5)
RBC CASTS # UR COMP ASSIST: >50 /HPF (ref 0–4)
SODIUM SERPL-SCNC: 136 MMOL/L — SIGNIFICANT CHANGE UP (ref 135–145)
SP GR SPEC: 1.01 — SIGNIFICANT CHANGE UP (ref 1.01–1.02)
UROBILINOGEN FLD QL: NEGATIVE — SIGNIFICANT CHANGE UP
WBC # BLD: 22.14 K/UL — HIGH (ref 3.8–10.5)
WBC # FLD AUTO: 22.14 K/UL — HIGH (ref 3.8–10.5)
WBC UR QL: >50 /HPF (ref 0–5)

## 2021-04-12 PROCEDURE — 80053 COMPREHEN METABOLIC PANEL: CPT

## 2021-04-12 PROCEDURE — 87186 SC STD MICRODIL/AGAR DIL: CPT

## 2021-04-12 PROCEDURE — 74176 CT ABD & PELVIS W/O CONTRAST: CPT | Mod: 26,MA

## 2021-04-12 PROCEDURE — 99284 EMERGENCY DEPT VISIT MOD MDM: CPT | Mod: 25

## 2021-04-12 PROCEDURE — 99285 EMERGENCY DEPT VISIT HI MDM: CPT

## 2021-04-12 PROCEDURE — 96375 TX/PRO/DX INJ NEW DRUG ADDON: CPT

## 2021-04-12 PROCEDURE — 76870 US EXAM SCROTUM: CPT

## 2021-04-12 PROCEDURE — 83690 ASSAY OF LIPASE: CPT

## 2021-04-12 PROCEDURE — 36415 COLL VENOUS BLD VENIPUNCTURE: CPT

## 2021-04-12 PROCEDURE — 87491 CHLMYD TRACH DNA AMP PROBE: CPT

## 2021-04-12 PROCEDURE — 87086 URINE CULTURE/COLONY COUNT: CPT

## 2021-04-12 PROCEDURE — 87591 N.GONORRHOEAE DNA AMP PROB: CPT

## 2021-04-12 PROCEDURE — 85025 COMPLETE CBC W/AUTO DIFF WBC: CPT

## 2021-04-12 PROCEDURE — 74176 CT ABD & PELVIS W/O CONTRAST: CPT

## 2021-04-12 PROCEDURE — 76870 US EXAM SCROTUM: CPT | Mod: 26

## 2021-04-12 PROCEDURE — 81001 URINALYSIS AUTO W/SCOPE: CPT

## 2021-04-12 PROCEDURE — 96374 THER/PROPH/DIAG INJ IV PUSH: CPT | Mod: 25

## 2021-04-12 RX ORDER — CEFUROXIME AXETIL 250 MG
1 TABLET ORAL
Qty: 20 | Refills: 0
Start: 2021-04-12 | End: 2021-04-21

## 2021-04-12 RX ORDER — LISINOPRIL 2.5 MG/1
10 TABLET ORAL DAILY
Refills: 0 | Status: DISCONTINUED | OUTPATIENT
Start: 2021-04-12 | End: 2021-04-16

## 2021-04-12 RX ORDER — MORPHINE SULFATE 50 MG/1
4 CAPSULE, EXTENDED RELEASE ORAL ONCE
Refills: 0 | Status: DISCONTINUED | OUTPATIENT
Start: 2021-04-12 | End: 2021-04-12

## 2021-04-12 RX ORDER — CEFTRIAXONE 500 MG/1
1000 INJECTION, POWDER, FOR SOLUTION INTRAMUSCULAR; INTRAVENOUS ONCE
Refills: 0 | Status: COMPLETED | OUTPATIENT
Start: 2021-04-12 | End: 2021-04-12

## 2021-04-12 RX ORDER — IBUPROFEN 200 MG
1 TABLET ORAL
Qty: 30 | Refills: 0
Start: 2021-04-12

## 2021-04-12 RX ADMIN — MORPHINE SULFATE 4 MILLIGRAM(S): 50 CAPSULE, EXTENDED RELEASE ORAL at 21:15

## 2021-04-12 RX ADMIN — MORPHINE SULFATE 4 MILLIGRAM(S): 50 CAPSULE, EXTENDED RELEASE ORAL at 20:45

## 2021-04-12 RX ADMIN — LISINOPRIL 10 MILLIGRAM(S): 2.5 TABLET ORAL at 20:44

## 2021-04-12 RX ADMIN — CEFTRIAXONE 100 MILLIGRAM(S): 500 INJECTION, POWDER, FOR SOLUTION INTRAMUSCULAR; INTRAVENOUS at 22:13

## 2021-04-12 NOTE — ED PROVIDER NOTE - PATIENT PORTAL LINK FT
You can access the FollowMyHealth Patient Portal offered by [facility] by registering at the following website: [email or web address]. By joining [facility]’s FollowMyHealth portal, you will also be able to view your health information using other applications (apps) compatible with our system.

## 2021-04-12 NOTE — ED ADULT NURSE NOTE - CHIEF COMPLAINT QUOTE
I have pain on urination and had a fever yesterday. My testicle is more swollen now too sisnce 5 months ago"

## 2021-04-12 NOTE — ED PROVIDER NOTE - OBJECTIVE STATEMENT
46 yo male, hx htn ( didn't take his lisinopril 10mg today), comes to the ED co testicle pain and swelling. As per patient he  has had swelling in his testicles Right > left over the past month and pain over the past week.  Over the past 2 days complaining of burning, urgency, frequency on urination, as well as blood in the urine today with fever ( t max yesterday 102) on and off for the past 2 days.  Denies any back pain, discharge from the penis, fevers, chills, n/v or any other complaints.  Last Tylenol about 6 hours ago

## 2021-04-12 NOTE — ED ADULT NURSE NOTE - OBJECTIVE STATEMENT
Pt presents to ED with c/o testicular pain x 5 months.  Endorses intermittent pain x 5 months.  Pt states that he has had urinary complaints x two days, including dysuria and cloudy urine.  Now endorses hematuria and suprapubic discomfort.  Endorses fevers at home, 102 F yesterday.

## 2021-04-12 NOTE — ED PROVIDER NOTE - ATTENDING CONTRIBUTION TO CARE
pt c/o dysuria, hematuria, frequency for last 2 days assoc c fever 102. no flank pain. no chest pain, sob ,cough, rhinorrhea, sore throat. no covid contacts or travel.  also with gradual swelling of scrotum for few months and last 1 week with pain in scrotum.  no penile dc. no h/o std    exam:   General: well appearing, NAD.   HEENT: eyes perrl,   cor: RRR, s1s2, 2+rad pulses.   lungs: ctabl, no resp distress.   abd: soft, ntnd.   : mild suprapubic ttp. moderate generalized scrotal swelling. no erythema or increased warmth. nontender. unable to palpate testicles discretely due to swelling. nonreducible swelling. penis normal appearing, no dc  neuro: a&ox3, cn2-12 intact, [place], 5/5 strength c nl sensation all extremities, nl coordination.   MSK: no extremity swelling.  Skin: normal, no rash    AP: 2 days dysuria hematuria, fever with months of scrotal swelling and 1 wk pain.  partial ddx: uti/cystitis, orchitis/epidydimitis, renal stone, hydrocele, varicocele, scrotal mass, less likely hernia. unlikely testicular torsion.  check labs, ua, scrotal sono, ct abdpelv

## 2021-04-12 NOTE — ED PROVIDER NOTE - CARE PLAN
Principal Discharge DX:	Testicle pain  Secondary Diagnosis:	Dysuria   Principal Discharge DX:	Acute cystitis with hematuria  Secondary Diagnosis:	Dysuria  Secondary Diagnosis:	Hydrocele, unspecified hydrocele type

## 2021-04-12 NOTE — ED PROVIDER NOTE - CARE PROVIDER_API CALL
Pollo Leonard  UROLOGY  [address] [place] [address], [address]  [place], [place] [id number]  Phone: [phone]  Fax: [phone]  Follow Up Time: 1-3 Days

## 2021-04-12 NOTE — ED PROVIDER NOTE - CLINICAL SUMMARY MEDICAL DECISION MAKING FREE TEXT BOX
44 yo male, hx htn ( didn't take his lisinopril 10mg today), comes to the ED co testicle pain and swelling. As per patient he  has had swelling in his testicles Right > left over the past month and pain over the past week.  Over the past 2 days complaining of burning, urgency, frequency on urination, as well as blood in the urine today , with a fever ( t max yesterday 102) on and off for the past 2 days.  Denies any back pain, discharge from the penis,  n/v or any other complaints.    diffuse scrotal swelling , unable to palpate testicles no reducible, +suprapubic ttp, will get CT a/p, US testicles, check labs, pain control, check urine as well as urine gc, re-eval

## 2021-04-12 NOTE — ED PROVIDER NOTE - NSFOLLOWUPINSTRUCTIONS_ED_ALL_ED_FT
- take ibuprofen for fever or pain  - take cefuroxime antibiotic    - follow up with urologist in next 3 days      Urinary Tract Infection in Men    WHAT YOU NEED TO KNOW:    A urinary tract infection (UTI) is caused by bacteria that get inside your urinary tract. Most bacteria that enter your urinary tract come out when you urinate. If the bacteria stay in your urinary tract, you may get an infection. Your urinary tract includes your kidneys, ureters, bladder, and urethra. Urine is made in your kidneys, and it flows from the ureters to the bladder. Urine leaves the bladder through the urethra. A UTI is more common in your lower urinary tract, which includes your bladder and urethra.              DISCHARGE INSTRUCTIONS:    Return to the emergency department if:   •You are urinating very little or not at all.      •You have a high fever with shaking chills.       •You have side or back pain that gets worse.      Contact your healthcare provider if:   •You have a mild fever.      •You do not feel better after 2 days of taking antibiotics.      •You are vomiting.       •You have new symptoms, such as blood or pus in your urine.       •You have questions or concerns about your condition or care.      Medicines:   •Antibiotics help fight a bacterial infection.       •Medicines may be given to decrease pain and burning when you urinate. They will also help decrease the feeling that you need to urinate often. These medicines will make your urine orange or red.      •Take your medicine as directed. Contact your healthcare provider if you think your medicine is not helping or if you have side effects. Tell him of her if you are allergic to any medicine. Keep a list of the medicines, vitamins, and herbs you take. Include the amounts, and when and why you take them. Bring the list or the pill bottles to follow-up visits. Carry your medicine list with you in case of an emergency.      Prevent another UTI:   •Empty your bladder often. Urinate and empty your bladder as soon as you feel the need. Do not hold your urine for long periods of time.      •Drink liquids as directed. Ask how much liquid to drink each day and which liquids are best for you. You may need to drink more liquids than usual to help flush out the bacteria. Do not drink alcohol, caffeine, or citrus juices. These can irritate your bladder and increase your symptoms. Your healthcare provider may recommend cranberry juice to help prevent a UTI.      •Urinate after you have sex. This can help flush out bacteria passed during sex.      •Do pelvic muscle exercises often. Pelvic muscle exercises may help you start and stop urinating. Strong pelvic muscles may help you empty your bladder easier. Squeeze these muscles tightly for 5 seconds like you are trying to hold back urine. Then relax for 5 seconds. Gradually work up to squeezing for 10 seconds. Do 3 sets of 15 repetitions a day, or as directed.      Follow up with your healthcare provider as directed: Write down your questions so you remember to ask them during your visits.         Hydrocele    WHAT YOU NEED TO KNOW:    A hydrocele is a collection of fluid inside the scrotum. The scrotum holds the testicles. Hydroceles are simple or communicating. A simple hydrocele stays the same size. A communicating hydrocele gets bigger and smaller as fluid flows into and out of the scrotum.    DISCHARGE INSTRUCTIONS:    Medicines:   •Pain medicine:   You may need medicine to take away or decrease pain. ?Learn how to take your medicine. Ask what medicine and how much you should take. Be sure you know how, when, and how often to take it.      ?Do not wait until the pain is severe before you take your medicine. Tell your healthcare provider if your pain does not decrease.      ?Pain medicine can make you dizzy or sleepy. Prevent falls by calling someone when you get out of bed or if you need help.      •Take your medicine as directed. Contact your healthcare provider if you think your medicine is not helping or if you have side effects. Tell him of her if you are allergic to any medicine. Keep a list of the medicines, vitamins, and herbs you take. Include the amounts, and when and why you take them. Bring the list or the pill bottles to follow-up visits. Carry your medicine list with you in case of an emergency.      Follow up with your healthcare provider or urologist as directed: Write down your questions so you remember to ask them during your visits.     Support: You may need to wear a fabric support device similar to a jock strap to decrease swelling.    Contact your healthcare provider or urologist if:   •The swelling gets worse or does not go away.      •You have questions or concerns about your condition or care.      Return to the emergency department if:   •You have severe pain in your scrotum.      •You have a fever and your scrotum is red and swollen.

## 2021-04-12 NOTE — ED ADULT TRIAGE NOTE - CHIEF COMPLAINT QUOTE
Advised patient that per Dr Baltazar that patient should follow up in clinic with [employer] rep present for follow up. Patient offered and scheduled for 10/21/2019 at 10 am in [place]. Sharyn Jules RN     I have pain on urination and had a fever yesterday. My testicle is more swollen now too sisnce 5 months ago"

## 2021-04-13 LAB
C TRACH RRNA SPEC QL NAA+PROBE: SIGNIFICANT CHANGE UP
N GONORRHOEA RRNA SPEC QL NAA+PROBE: SIGNIFICANT CHANGE UP
SPECIMEN SOURCE: SIGNIFICANT CHANGE UP

## 2021-04-14 ENCOUNTER — TRANSCRIPTION ENCOUNTER (OUTPATIENT)
Age: 46
End: 2021-04-14

## 2021-04-16 ENCOUNTER — APPOINTMENT (OUTPATIENT)
Dept: FAMILY MEDICINE | Facility: CLINIC | Age: 46
End: 2021-04-16
Payer: MEDICAID

## 2021-04-16 VITALS
WEIGHT: 273 LBS | HEART RATE: 102 BPM | DIASTOLIC BLOOD PRESSURE: 82 MMHG | RESPIRATION RATE: 12 BRPM | BODY MASS INDEX: 41.37 KG/M2 | SYSTOLIC BLOOD PRESSURE: 124 MMHG | OXYGEN SATURATION: 98 % | TEMPERATURE: 96.8 F | HEIGHT: 68 IN

## 2021-04-16 DIAGNOSIS — Z00.00 ENCOUNTER FOR GENERAL ADULT MEDICAL EXAMINATION W/OUT ABNORMAL FINDINGS: ICD-10-CM

## 2021-04-16 DIAGNOSIS — Z95.5 PRESENCE OF CORONARY ANGIOPLASTY IMPLANT AND GRAFT: ICD-10-CM

## 2021-04-16 DIAGNOSIS — Z87.891 PERSONAL HISTORY OF NICOTINE DEPENDENCE: ICD-10-CM

## 2021-04-16 DIAGNOSIS — Z78.9 OTHER SPECIFIED HEALTH STATUS: ICD-10-CM

## 2021-04-16 DIAGNOSIS — I25.2 OLD MYOCARDIAL INFARCTION: ICD-10-CM

## 2021-04-16 PROCEDURE — 99072 ADDL SUPL MATRL&STAF TM PHE: CPT

## 2021-04-16 PROCEDURE — 99203 OFFICE O/P NEW LOW 30 MIN: CPT

## 2021-04-19 PROBLEM — Z78.9 SOCIAL ALCOHOL USE: Status: ACTIVE | Noted: 2021-04-19

## 2021-04-19 PROBLEM — Z78.9 CAFFEINE USE: Status: ACTIVE | Noted: 2021-04-19

## 2021-04-19 PROBLEM — Z87.891 FORMER SMOKER: Status: ACTIVE | Noted: 2021-04-19

## 2021-04-19 NOTE — HISTORY OF PRESENT ILLNESS
[de-identified] : 46 y/o here to establish care. Pt had hospitalization last year for CP r/o ACS Nuclear stress test + inf wall defect h/o cardiac stent  notes was taking meds given but stopped run out rx and did not f/u MD pt here denies CP SOB palpitations or dizziness Pt ended up in ER 4/12/2021 swelling scrotum for over 3 months blood in urine and pain on urination  notes no more blood in urine has apt next week Monday urology still scrotal swelling and feeling tugged on scrotum  and pelvic pain. Notes here doing better taking abx and has urology apt but needs referral. Pt states is placing himself on diet and is going to loose weight

## 2021-04-19 NOTE — HEALTH RISK ASSESSMENT
[Very Good] : ~his/her~  mood as very good [Intercurrent ED visits] : went to ED [Yes] : Yes [No falls in past year] : Patient reported no falls in the past year [0] : 2) Feeling down, depressed, or hopeless: Not at all (0) [HIV test declined] : HIV test declined [Hepatitis C test declined] : Hepatitis C test declined [None] : None [With Family] : lives with family [Single] : single [# Of Children ___] : has [unfilled] children [Fully functional (bathing, dressing, toileting, transferring, walking, feeding)] : Fully functional (bathing, dressing, toileting, transferring, walking, feeding) [Fully functional (using the telephone, shopping, preparing meals, housekeeping, doing laundry, using] : Fully functional and needs no help or supervision to perform IADLs (using the telephone, shopping, preparing meals, housekeeping, doing laundry, using transportation, managing medications and managing finances) [] : No [de-identified] : last 4/12/2021 [de-identified] : Urology DR Oconnor [de-identified] : stopped 3/2020 [de-identified] : on weekends [[id number]] : 0 [Change in mental status noted] : No change in mental status noted [Sexually Active] : not sexually active [de-identified] :  children [AdvancecareDate] : d/w pt 4/16/2021

## 2021-04-19 NOTE — COUNSELING
[Potential consequences of obesity discussed] : Potential consequences of obesity discussed [Benefits of weight loss discussed] : Benefits of weight loss discussed [Structured Weight Management Program suggested:] : Structured weight management program suggested [Encouraged to maintain food diary] : Encouraged to maintain food diary [Encouraged to increase physical activity] : Encouraged to increase physical activity [Weigh Self Weekly] : weigh self weekly [Encouraged to use exercise tracking device] : Encouraged to use exercise tracking device [____ min/wk Activity] : [unfilled] min/wk activity [Good understanding] : Patient has a good understanding of disease, goals and obesity follow-up plan [FreeTextEntry2] : stopped smoking last year

## 2021-04-20 ENCOUNTER — NON-APPOINTMENT (OUTPATIENT)
Age: 46
End: 2021-04-20

## 2021-04-20 ENCOUNTER — APPOINTMENT (OUTPATIENT)
Dept: CARDIOLOGY | Facility: CLINIC | Age: 46
End: 2021-04-20
Payer: MEDICAID

## 2021-04-20 VITALS
TEMPERATURE: 96.7 F | HEART RATE: 89 BPM | BODY MASS INDEX: 41.83 KG/M2 | WEIGHT: 276 LBS | DIASTOLIC BLOOD PRESSURE: 99 MMHG | SYSTOLIC BLOOD PRESSURE: 144 MMHG | OXYGEN SATURATION: 98 % | RESPIRATION RATE: 12 BRPM | HEIGHT: 68 IN

## 2021-04-20 PROCEDURE — 99214 OFFICE O/P EST MOD 30 MIN: CPT

## 2021-04-20 PROCEDURE — 93000 ELECTROCARDIOGRAM COMPLETE: CPT

## 2021-04-20 PROCEDURE — 99072 ADDL SUPL MATRL&STAF TM PHE: CPT

## 2021-04-20 NOTE — ASSESSMENT
[FreeTextEntry1] : In summary, the patient is a 45 yr old man with a hx of cad who is currently asymptomatic and on appropriate medical therapy.\par \par The patient will go for full blood work including lipid profile, hepatic profile and CPK\par \par I will see him again in 3 months time or sooner if needed

## 2021-04-20 NOTE — REASON FOR VISIT
[Consultation] : a consultation regarding [Coronary Artery Disease] : coronary artery disease [FreeTextEntry1] : This is a 45-year-old man who presents for cardiac evaluation. The patient approximately 5 years ago had severe chest pain syndrome and was found to have a 99% right coronary artery and underwent stenting to that vessel. He continues to smoke and did not take medications after the event. However approximately one year ago he presented to the hospital and underwent repeat evaluation. At that time he was found to have mostly a fixed inferior wall defect with mild mercedes-infarct ischemia. Nuclear ejection fraction was measured at 38%. However echocardiography ejection fraction at that time was 55-60%. He states he was recommended to undergo cardiac catheterization but chose to stop smoking immediately and began taking medications. He states since that time he has felt well. He denies chest discomfort shortness of breath palpitations dizziness or syncope. He was a smoker of less than a pack a day for 20 years but as mentioned has not smoked for one year. He has had a history of hypertension. He denies diabetes he does have a history of dyslipidemia with an HDL cholesterol measured last year of 29. Family history significant for father who  of age. His mother  of some type of heart problem.

## 2021-04-20 NOTE — PHYSICAL EXAM
[General Appearance - Well Developed] : well developed [Normal Appearance] : normal appearance [Well Groomed] : well groomed [General Appearance - Well Nourished] : well nourished [No Deformities] : no deformities [General Appearance - In No Acute Distress] : no acute distress [Normal Oral Mucosa] : normal oral mucosa [No Oral Pallor] : no oral pallor [No Oral Cyanosis] : no oral cyanosis [Normal Jugular Venous A Waves Present] : normal jugular venous A waves present [Normal Jugular Venous V Waves Present] : normal jugular venous V waves present [No Jugular Venous Warren A Waves] : no jugular venous warren A waves [Respiration, Rhythm And Depth] : normal respiratory rhythm and effort [Exaggerated Use Of Accessory Muscles For Inspiration] : no accessory muscle use [Auscultation Breath Sounds / Voice Sounds] : lungs were clear to auscultation bilaterally [Abdomen Tenderness] : non-tender [Abdomen Soft] : soft [Abdomen Mass (___ Cm)] : no abdominal mass palpated [Abnormal Walk] : normal gait [Gait - Sufficient For Exercise Testing] : the gait was sufficient for exercise testing [Nail Clubbing] : no clubbing of the fingernails [Cyanosis, Localized] : no localized cyanosis [Petechial Hemorrhages (___cm)] : no petechial hemorrhages [Skin Color & Pigmentation] : normal skin color and pigmentation [] : no rash [No Venous Stasis] : no venous stasis [Skin Lesions] : no skin lesions [No Skin Ulcers] : no skin ulcer [No Xanthoma] : no  xanthoma was observed [Oriented To Time, Place, And Person] : oriented to person, place, and time [Affect] : the affect was normal [Mood] : the mood was normal [No Anxiety] : not feeling anxious

## 2021-04-27 LAB
ALBUMIN SERPL ELPH-MCNC: 4.4 G/DL
ALP BLD-CCNC: 114 U/L
ALT SERPL-CCNC: 41 U/L
ANION GAP SERPL CALC-SCNC: 13 MMOL/L
AST SERPL-CCNC: 29 U/L
BASOPHILS # BLD AUTO: 0.07 K/UL
BASOPHILS NFR BLD AUTO: 0.7 %
BILIRUB SERPL-MCNC: 0.5 MG/DL
BUN SERPL-MCNC: 9 MG/DL
CALCIUM SERPL-MCNC: 9.8 MG/DL
CHLORIDE SERPL-SCNC: 100 MMOL/L
CHOLEST SERPL-MCNC: 167 MG/DL
CO2 SERPL-SCNC: 26 MMOL/L
CREAT SERPL-MCNC: 1.04 MG/DL
EOSINOPHIL # BLD AUTO: 0.19 K/UL
EOSINOPHIL NFR BLD AUTO: 1.8 %
GLUCOSE SERPL-MCNC: 120 MG/DL
HCT VFR BLD CALC: 40.2 %
HDLC SERPL-MCNC: 33 MG/DL
HGB BLD-MCNC: 13.3 G/DL
IMM GRANULOCYTES NFR BLD AUTO: 0.6 %
LDLC SERPL CALC-MCNC: 111 MG/DL
LYMPHOCYTES # BLD AUTO: 2.75 K/UL
LYMPHOCYTES NFR BLD AUTO: 26.6 %
MAN DIFF?: NORMAL
MCHC RBC-ENTMCNC: 26.9 PG
MCHC RBC-ENTMCNC: 33.1 GM/DL
MCV RBC AUTO: 81.2 FL
MONOCYTES # BLD AUTO: 1.22 K/UL
MONOCYTES NFR BLD AUTO: 11.8 %
NEUTROPHILS # BLD AUTO: 6.06 K/UL
NEUTROPHILS NFR BLD AUTO: 58.5 %
NONHDLC SERPL-MCNC: 134 MG/DL
PLATELET # BLD AUTO: 431 K/UL
POTASSIUM SERPL-SCNC: 4.5 MMOL/L
PROT SERPL-MCNC: 7.7 G/DL
PSA FREE FLD-MCNC: 8 %
PSA FREE SERPL-MCNC: 0.25 NG/ML
PSA SERPL-MCNC: 3.25 NG/ML
RBC # BLD: 4.95 M/UL
RBC # FLD: 12.9 %
SODIUM SERPL-SCNC: 139 MMOL/L
T3 SERPL-MCNC: 176 NG/DL
T4 SERPL-MCNC: 6.4 UG/DL
TRIGL SERPL-MCNC: 112 MG/DL
TSH SERPL-ACNC: 2.34 UIU/ML
WBC # FLD AUTO: 10.35 K/UL

## 2021-06-01 ENCOUNTER — OUTPATIENT (OUTPATIENT)
Dept: OUTPATIENT SERVICES | Facility: HOSPITAL | Age: 46
LOS: 1 days | End: 2021-06-01
Payer: MEDICAID

## 2021-06-01 VITALS
HEART RATE: 74 BPM | OXYGEN SATURATION: 100 % | TEMPERATURE: 98 F | RESPIRATION RATE: 16 BRPM | DIASTOLIC BLOOD PRESSURE: 88 MMHG | HEIGHT: 67 IN | SYSTOLIC BLOOD PRESSURE: 146 MMHG | WEIGHT: 261.91 LBS

## 2021-06-01 DIAGNOSIS — Z01.818 ENCOUNTER FOR OTHER PREPROCEDURAL EXAMINATION: ICD-10-CM

## 2021-06-01 DIAGNOSIS — N43.3 HYDROCELE, UNSPECIFIED: ICD-10-CM

## 2021-06-01 DIAGNOSIS — E78.5 HYPERLIPIDEMIA, UNSPECIFIED: ICD-10-CM

## 2021-06-01 DIAGNOSIS — I25.10 ATHEROSCLEROTIC HEART DISEASE OF NATIVE CORONARY ARTERY WITHOUT ANGINA PECTORIS: ICD-10-CM

## 2021-06-01 DIAGNOSIS — Z95.5 PRESENCE OF CORONARY ANGIOPLASTY IMPLANT AND GRAFT: Chronic | ICD-10-CM

## 2021-06-01 DIAGNOSIS — I10 ESSENTIAL (PRIMARY) HYPERTENSION: ICD-10-CM

## 2021-06-01 PROCEDURE — G0463: CPT

## 2021-06-01 RX ORDER — LISINOPRIL 2.5 MG/1
1 TABLET ORAL
Qty: 0 | Refills: 0 | DISCHARGE

## 2021-06-01 NOTE — H&P PST ADULT - NEGATIVE ENMT SYMPTOMS
no hearing difficulty/no sinus symptoms/no nasal discharge/no post-nasal discharge/no throat pain/no dysphagia

## 2021-06-01 NOTE — H&P PST ADULT - NSANTHOSAYNRD_GEN_A_CORE
neck 18.75 inches/No. PAULA screening performed.  STOP BANG Legend: 0-2 = LOW Risk; 3-4 = INTERMEDIATE Risk; 5-8 = HIGH Risk

## 2021-06-01 NOTE — H&P PST ADULT - NSICDXPASTMEDICALHX_GEN_ALL_CORE_FT
PAST MEDICAL HISTORY:  Essential hypertension     HLD (hyperlipidemia)     Hydrocele, unspecified     MI (myocardial infarction)     Smoker

## 2021-06-01 NOTE — H&P PST ADULT - HISTORY OF PRESENT ILLNESS
45 y.o. male with preop diagnosis of hydrocele, unspecifie, c/o right testicular swelling and intermittent tension like discomfor > 6 months, scheduled for right hydroceclectomy 45 y.o. male with h/o HTN, HLD, CAD x 1 stent, preop diagnosis of hydrocele, unspecified, c/o right testicular swelling and intermittent discomfort > 6 months, scheduled for right hydrocelectomy

## 2021-06-01 NOTE — H&P PST ADULT - NSICDXPROBLEM_GEN_ALL_CORE_FT
PROBLEM DIAGNOSES  Problem: Hydrocele, unspecified  Assessment and Plan: pt scheduled for right hydrocelectomy on 06/01/21  Preop instructions provided. Pt verbalized understanding.    written and verbal instructions with teach back on chlorhexidine shampoo provided,  pt verbalized understanding with returned demonstration   COVID test pending 72 hrs preop, pt will schedule appt, # provided  s/p cardiac eval, copy in chart  copies of echo and stress test from 2020 in chart    Problem: CAD (coronary artery disease)  Assessment and Plan: x 1 stents, on aspirin  continue aspirin as per cardiologist instructions    Problem: HTN (hypertension)  Assessment and Plan: continue meds as prescribed    Problem: HLD (hyperlipidemia)  Assessment and Plan: continue meds as prescribed       PROBLEM DIAGNOSES  Problem: Hydrocele, unspecified  Assessment and Plan: pt scheduled for right hydrocelectomy on 06/01/21  Preop instructions provided. Pt verbalized understanding.    written and verbal instructions with teach back on chlorhexidine shampoo provided,  pt verbalized understanding with returned demonstration   COVID test pending 72 hrs preop, pt will schedule appt, # provided  last visit cardiac note in chart, pending updated card eval  copies of echo and stress test from 2020 in chart    Problem: CAD (coronary artery disease)  Assessment and Plan: x 1 stents, on aspirin  continue aspirin as per cardiologist instructions    Problem: HTN (hypertension)  Assessment and Plan: continue meds as prescribed    Problem: HLD (hyperlipidemia)  Assessment and Plan: continue meds as prescribed       PROBLEM DIAGNOSES  Problem: Hydrocele, unspecified  Assessment and Plan: pt scheduled for right hydrocelectomy on 06/01/21  Preop instructions provided. Pt verbalized understanding.    written and verbal instructions with teach back on chlorhexidine shampoo provided,  pt verbalized understanding with returned demonstration   COVID test pending 72 hrs preop, pt will schedule appt, # provided  last visit cardiac note in chart,   copies of echo and stress test from 2020 in chart  med eval pending, pt will schedule appt, copy requested    Problem: CAD (coronary artery disease)  Assessment and Plan: x 1 stents, on aspirin  continue aspirin as per cardiologist instructions    Problem: HTN (hypertension)  Assessment and Plan: continue meds as prescribed    Problem: HLD (hyperlipidemia)  Assessment and Plan: continue meds as prescribed

## 2021-06-03 PROBLEM — E78.5 HYPERLIPIDEMIA, UNSPECIFIED: Chronic | Status: ACTIVE | Noted: 2021-06-01

## 2021-06-03 PROBLEM — N43.3 HYDROCELE, UNSPECIFIED: Chronic | Status: ACTIVE | Noted: 2021-06-01

## 2021-06-07 ENCOUNTER — APPOINTMENT (OUTPATIENT)
Dept: FAMILY MEDICINE | Facility: CLINIC | Age: 46
End: 2021-06-07
Payer: MEDICAID

## 2021-06-07 VITALS
OXYGEN SATURATION: 97 % | DIASTOLIC BLOOD PRESSURE: 68 MMHG | WEIGHT: 160 LBS | SYSTOLIC BLOOD PRESSURE: 110 MMHG | HEART RATE: 97 BPM | TEMPERATURE: 97.3 F | RESPIRATION RATE: 14 BRPM | BODY MASS INDEX: 24.33 KG/M2

## 2021-06-07 VITALS
HEART RATE: 97 BPM | WEIGHT: 260 LBS | SYSTOLIC BLOOD PRESSURE: 110 MMHG | OXYGEN SATURATION: 97 % | DIASTOLIC BLOOD PRESSURE: 68 MMHG | BODY MASS INDEX: 39.53 KG/M2 | RESPIRATION RATE: 14 BRPM | TEMPERATURE: 97.3 F

## 2021-06-07 PROCEDURE — 99214 OFFICE O/P EST MOD 30 MIN: CPT | Mod: 25

## 2021-06-07 PROCEDURE — 93000 ELECTROCARDIOGRAM COMPLETE: CPT

## 2021-06-07 RX ORDER — CEFUROXIME AXETIL 500 MG/1
500 TABLET ORAL
Qty: 20 | Refills: 0 | Status: COMPLETED | COMMUNITY
Start: 2021-04-13

## 2021-06-07 RX ORDER — IBUPROFEN 600 MG/1
600 TABLET, FILM COATED ORAL
Qty: 30 | Refills: 0 | Status: COMPLETED | COMMUNITY
Start: 2021-04-13

## 2021-06-08 LAB
ALBUMIN SERPL ELPH-MCNC: 4.4 G/DL
ALP BLD-CCNC: 128 U/L
ALT SERPL-CCNC: 24 U/L
ANION GAP SERPL CALC-SCNC: 10 MMOL/L
AST SERPL-CCNC: 22 U/L
BASOPHILS # BLD AUTO: 0.06 K/UL
BASOPHILS NFR BLD AUTO: 0.6 %
BILIRUB SERPL-MCNC: 0.4 MG/DL
BUN SERPL-MCNC: 10 MG/DL
CALCIUM SERPL-MCNC: 10.1 MG/DL
CHLORIDE SERPL-SCNC: 101 MMOL/L
CO2 SERPL-SCNC: 28 MMOL/L
CREAT SERPL-MCNC: 1.02 MG/DL
EOSINOPHIL # BLD AUTO: 0.3 K/UL
EOSINOPHIL NFR BLD AUTO: 3 %
GLUCOSE SERPL-MCNC: 79 MG/DL
HCT VFR BLD CALC: 42.1 %
HGB BLD-MCNC: 13 G/DL
IMM GRANULOCYTES NFR BLD AUTO: 0.2 %
INR PPP: 1.12 RATIO
LYMPHOCYTES # BLD AUTO: 3.15 K/UL
LYMPHOCYTES NFR BLD AUTO: 31.9 %
MAN DIFF?: NORMAL
MCHC RBC-ENTMCNC: 25.9 PG
MCHC RBC-ENTMCNC: 30.9 GM/DL
MCV RBC AUTO: 83.9 FL
MONOCYTES # BLD AUTO: 1.26 K/UL
MONOCYTES NFR BLD AUTO: 12.8 %
NEUTROPHILS # BLD AUTO: 5.09 K/UL
NEUTROPHILS NFR BLD AUTO: 51.5 %
PLATELET # BLD AUTO: 386 K/UL
POTASSIUM SERPL-SCNC: 4.8 MMOL/L
PROT SERPL-MCNC: 7.6 G/DL
PT BLD: 13.3 SEC
RBC # BLD: 5.02 M/UL
RBC # FLD: 13.4 %
SODIUM SERPL-SCNC: 139 MMOL/L
WBC # FLD AUTO: 9.88 K/UL

## 2021-06-08 NOTE — PHYSICAL EXAM
[Normal] : normal rate, regular rhythm, normal S1 and S2 and no murmur heard [Soft] : abdomen soft [Non Tender] : non-tender [Non-distended] : non-distended [No Masses] : no abdominal mass palpated [de-identified] : central obesity [de-identified] : short narrow neck

## 2021-06-08 NOTE — HISTORY OF PRESENT ILLNESS
[Coronary Artery Disease] : coronary artery disease [No Pertinent Pulmonary History] : no history of asthma, COPD, sleep apnea, or smoking [Chronic Anticoagulation] : chronic anticoagulation [(Patient denies any chest pain, claudication, dyspnea on exertion, orthopnea, palpitations or syncope)] : Patient denies any chest pain, claudication, dyspnea on exertion, orthopnea, palpitations or syncope [Aortic Stenosis] : no aortic stenosis [Atrial Fibrillation] : no atrial fibrillation [Recent Myocardial Infarction] : no recent myocardial infarction [Implantable Device/Pacemaker] : no implantable device/pacemaker [Family Member] : no family member with adverse anesthesia reaction/sudden death [Chronic Kidney Disease] : no chronic kidney disease [Diabetes] : no diabetes [FreeTextEntry1] : Right Testicular Hydrocele surgery [FreeTextEntry2] : 6/15/2021 [FreeTextEntry3] : Dr. Jarvis [FreeTextEntry4] : 46 y/o PMHX CAD  f/u cardiology here for evaluation prior to right hydrocele surgery scheduled 6/15/2021 \par Pt notes  no CP SOB palpatations or dizziness  [FreeTextEntry5] : never had anesthesia

## 2021-06-08 NOTE — RESULTS/DATA
[] : results reviewed [ECG Reviewed] : reviewed [No Acute Ischemia] : No acute ischemia [No Interval Change] : no interval change

## 2021-06-08 NOTE — ASSESSMENT
[High Risk Surgery - Intraperitoneal, Intrathoracic or Supringuinal Vascular Procedures] : High Risk Surgery - Intraperitoneal, Intrathoracic or Supringuinal Vascular Procedures - No (0) [Ischemic Heart Disease] : Ischemic Heart Disease  - Yes (1) [Congestive Heart Failure] : Congestive Heart Failure - No (0) [Prior Cerebrovascular Accident or TIA] : Prior Cerebrovascular Accident or TIA - No (0) [Creatinine >= 2mg/dL (1 Point)] : Creatinine >= 2mg/dL - No (0) [Insulin-dependent Diabetic (1 Point)] : Insulin-dependent Diabetic - No (0) [1] : 1 , RCRI Class: II, Risk of Post-Op Cardiac Complications: 6.0%, 95% CI for Risk Estimate: 4.9% - 7.4% [Patient Optimized for Surgery] : Patient optimized for surgery [No Further Testing Recommended] : no further testing recommended [As per surgery] : as per surgery

## 2021-06-12 ENCOUNTER — TRANSCRIPTION ENCOUNTER (OUTPATIENT)
Age: 46
End: 2021-06-12

## 2021-06-14 ENCOUNTER — OUTPATIENT (OUTPATIENT)
Dept: OUTPATIENT SERVICES | Facility: HOSPITAL | Age: 46
LOS: 1 days | End: 2021-06-14
Payer: MEDICAID

## 2021-06-14 ENCOUNTER — TRANSCRIPTION ENCOUNTER (OUTPATIENT)
Age: 46
End: 2021-06-14

## 2021-06-14 DIAGNOSIS — Z20.828 CONTACT WITH AND (SUSPECTED) EXPOSURE TO OTHER VIRAL COMMUNICABLE DISEASES: ICD-10-CM

## 2021-06-14 DIAGNOSIS — Z95.5 PRESENCE OF CORONARY ANGIOPLASTY IMPLANT AND GRAFT: Chronic | ICD-10-CM

## 2021-06-14 LAB — SARS-COV-2 RNA SPEC QL NAA+PROBE: SIGNIFICANT CHANGE UP

## 2021-06-14 PROCEDURE — U0003: CPT

## 2021-06-14 PROCEDURE — U0005: CPT

## 2021-06-15 ENCOUNTER — RESULT REVIEW (OUTPATIENT)
Age: 46
End: 2021-06-15

## 2021-06-15 ENCOUNTER — OUTPATIENT (OUTPATIENT)
Dept: OUTPATIENT SERVICES | Facility: HOSPITAL | Age: 46
LOS: 1 days | End: 2021-06-15
Payer: MEDICAID

## 2021-06-15 VITALS
HEIGHT: 67 IN | RESPIRATION RATE: 16 BRPM | OXYGEN SATURATION: 98 % | WEIGHT: 261.91 LBS | HEART RATE: 86 BPM | DIASTOLIC BLOOD PRESSURE: 89 MMHG | SYSTOLIC BLOOD PRESSURE: 144 MMHG | TEMPERATURE: 98 F

## 2021-06-15 VITALS
DIASTOLIC BLOOD PRESSURE: 88 MMHG | HEART RATE: 78 BPM | SYSTOLIC BLOOD PRESSURE: 144 MMHG | RESPIRATION RATE: 16 BRPM | TEMPERATURE: 98 F | OXYGEN SATURATION: 98 %

## 2021-06-15 DIAGNOSIS — Z95.5 PRESENCE OF CORONARY ANGIOPLASTY IMPLANT AND GRAFT: Chronic | ICD-10-CM

## 2021-06-15 DIAGNOSIS — N43.3 HYDROCELE, UNSPECIFIED: ICD-10-CM

## 2021-06-15 PROCEDURE — ZZZZZ: CPT

## 2021-06-15 PROCEDURE — 88302 TISSUE EXAM BY PATHOLOGIST: CPT

## 2021-06-15 PROCEDURE — 55040 REMOVAL OF HYDROCELE: CPT

## 2021-06-15 PROCEDURE — 88302 TISSUE EXAM BY PATHOLOGIST: CPT | Mod: 26

## 2021-06-15 RX ORDER — HYDROMORPHONE HYDROCHLORIDE 2 MG/ML
0.5 INJECTION INTRAMUSCULAR; INTRAVENOUS; SUBCUTANEOUS
Refills: 0 | Status: DISCONTINUED | OUTPATIENT
Start: 2021-06-15 | End: 2021-06-15

## 2021-06-15 RX ORDER — SODIUM CHLORIDE 9 MG/ML
1000 INJECTION, SOLUTION INTRAVENOUS
Refills: 0 | Status: DISCONTINUED | OUTPATIENT
Start: 2021-06-15 | End: 2021-06-15

## 2021-06-15 RX ORDER — HYDROMORPHONE HYDROCHLORIDE 2 MG/ML
1 INJECTION INTRAMUSCULAR; INTRAVENOUS; SUBCUTANEOUS
Refills: 0 | Status: DISCONTINUED | OUTPATIENT
Start: 2021-06-15 | End: 2021-06-15

## 2021-06-15 RX ORDER — LISINOPRIL 2.5 MG/1
1 TABLET ORAL
Qty: 0 | Refills: 0 | DISCHARGE

## 2021-06-15 RX ORDER — OXYCODONE HYDROCHLORIDE 5 MG/1
5 TABLET ORAL EVERY 6 HOURS
Refills: 0 | Status: DISCONTINUED | OUTPATIENT
Start: 2021-06-15 | End: 2021-06-15

## 2021-06-15 RX ORDER — ONDANSETRON 8 MG/1
4 TABLET, FILM COATED ORAL ONCE
Refills: 0 | Status: DISCONTINUED | OUTPATIENT
Start: 2021-06-15 | End: 2021-06-15

## 2021-06-15 RX ORDER — HYDRALAZINE HCL 50 MG
5 TABLET ORAL
Refills: 0 | Status: DISCONTINUED | OUTPATIENT
Start: 2021-06-15 | End: 2021-06-15

## 2021-06-15 RX ORDER — ONDANSETRON 8 MG/1
4 TABLET, FILM COATED ORAL EVERY 6 HOURS
Refills: 0 | Status: DISCONTINUED | OUTPATIENT
Start: 2021-06-15 | End: 2021-06-29

## 2021-06-15 RX ORDER — ASPIRIN/CALCIUM CARB/MAGNESIUM 324 MG
1 TABLET ORAL
Qty: 0 | Refills: 0 | DISCHARGE

## 2021-06-15 RX ADMIN — HYDROMORPHONE HYDROCHLORIDE 0.5 MILLIGRAM(S): 2 INJECTION INTRAMUSCULAR; INTRAVENOUS; SUBCUTANEOUS at 10:14

## 2021-06-15 RX ADMIN — SODIUM CHLORIDE 50 MILLILITER(S): 9 INJECTION, SOLUTION INTRAVENOUS at 08:09

## 2021-06-15 NOTE — ASU DISCHARGE PLAN (ADULT/PEDIATRIC) - NURSING INSTRUCTIONS
Call the office for Follow up appointment for Friday. All of discharge, safety, pain management, follow up with surgeon. Verbalized understanding of all instructions.

## 2021-06-15 NOTE — ASU PREOP CHECKLIST - COMMENTS
pt had 8 ounces of water at 5:30 am-Dr Fishman is aware, pt has been npo other than that since midnight

## 2021-06-15 NOTE — ASU PATIENT PROFILE, ADULT - CENTRAL VENOUS CATHETER
Patient complains of increased pain with movement rating pain at a 6/10. Dr. Garsia  Paged. Dr. Garsia to place orders. Will continue to monitor.    no

## 2021-06-15 NOTE — ASU DISCHARGE PLAN (ADULT/PEDIATRIC) - ASU DC SPECIAL INSTRUCTIONSFT
WEAR SCROTAL SUPPORT FOR 48 HOURS    YOU MAY SHOWER ON THURSDAY 6/17/21    DO NOT TAKE ASPIRIN UNTIL YOU SEE DR. ORTIZ FOR YOUR FOLLOW UP APPOINTMENT    TAKE PERCOCET FOR SEVERE PAIN, OTHERWISE TAKE TYLENOL    DO NOT DRIVE WHILE TAKING PERCOCET    YOU HAVE A DRAIN SUTURED TO THE SCROTUM, IT WILL BE REMOVED BY DR. ORTIZ IN THE OFFICE    PLEASE CALL THE OFFICE TO SET UP AN APPOINTMENT FOR FRIDAY 6/18/21

## 2021-06-15 NOTE — ASU PATIENT PROFILE, ADULT - PMH
Essential hypertension    HLD (hyperlipidemia)    Hydrocele, unspecified    MI (myocardial infarction)    Smoker     Essential hypertension    HLD (hyperlipidemia)    Hydrocele, unspecified    MI (myocardial infarction)  2018  Smoker  quit in 2019

## 2021-06-15 NOTE — ASU PATIENT PROFILE, ADULT - ANESTHESIA, PREVIOUS REACTION, PROFILE
pt denies family hx of complications with anesthesia/none pt denies family hx of complications with anesthesia/unknown

## 2021-06-15 NOTE — ASU PATIENT PROFILE, ADULT - NS PRO AD INFO GIVEN Y
Dx: Baker's cyst R knee         Authorized # of Visits:  medicare         Next MD visit: none scheduled  Fall Risk: standard         Precautions: n/a             Subjective: Pain: 1/10. Was walking around a lot yesterday and was using the cane.  Has some R and terminal knee extension in stance (8 visits)-MET  · Pt will improve knee AROM flexion to >/=120 degrees to improve ability to perform transfers with minimal difficulty (8 visits)-MET  · Pt will improve quad strength to 5/5 to ascend 1 flight of stairs Hans stretch x1 min Hans stretch x1 min    [place] knee flexion/extension x10 Quad set + SLR x10    Cueing for contraction prior to [employer] + SLR x20    Cueing for contraction prior to Michael set +R SLR x20 Trinity Varghese knee/  gastrocnemius mm   15 min     STM/effleurage r R knee/  gastrocnemius mm   15 min       Date: 8/27/2018  Tx#: 9/16 Date: 8/29  Tx#: 10/ Date: 9/4  Tx#: 11/ Date: 9/6  Tx#: 12/ Date: 9/10  Tx#: 13/ Date: Tx#: 14/ Date:    Tx#: 15/   NuStep 5 min L7 yes

## 2021-06-15 NOTE — ASU DISCHARGE PLAN (ADULT/PEDIATRIC) - CARE PROVIDER_API CALL
Desmond Smith  UROLOGY  [address] [place] [address], [address]  [place], [place] [id number]  Phone: [phone]  Fax: [phone]  Follow Up Time:

## 2021-06-21 LAB — SURGICAL PATHOLOGY STUDY: SIGNIFICANT CHANGE UP

## 2021-07-22 ENCOUNTER — APPOINTMENT (OUTPATIENT)
Dept: CARDIOLOGY | Facility: CLINIC | Age: 46
End: 2021-07-22

## 2021-07-22 ENCOUNTER — APPOINTMENT (OUTPATIENT)
Dept: CARDIOLOGY | Facility: CLINIC | Age: 46
End: 2021-07-22
Payer: MEDICAID

## 2021-07-22 VITALS
TEMPERATURE: 97.4 F | BODY MASS INDEX: 39.4 KG/M2 | DIASTOLIC BLOOD PRESSURE: 96 MMHG | HEIGHT: 68 IN | OXYGEN SATURATION: 100 % | WEIGHT: 260 LBS | RESPIRATION RATE: 14 BRPM | HEART RATE: 78 BPM | SYSTOLIC BLOOD PRESSURE: 160 MMHG

## 2021-07-22 VITALS — HEART RATE: 76 BPM | DIASTOLIC BLOOD PRESSURE: 88 MMHG | SYSTOLIC BLOOD PRESSURE: 130 MMHG

## 2021-07-22 PROBLEM — I21.9 ACUTE MYOCARDIAL INFARCTION, UNSPECIFIED: Chronic | Status: ACTIVE | Noted: 2019-05-14

## 2021-07-22 PROCEDURE — 93000 ELECTROCARDIOGRAM COMPLETE: CPT

## 2021-07-22 PROCEDURE — 99214 OFFICE O/P EST MOD 30 MIN: CPT

## 2021-07-22 NOTE — PHYSICAL EXAM
[General Appearance - Well Developed] : well developed [Well Groomed] : well groomed [Normal Appearance] : normal appearance [General Appearance - Well Nourished] : well nourished [No Deformities] : no deformities [General Appearance - In No Acute Distress] : no acute distress [Normal Oral Mucosa] : normal oral mucosa [No Oral Pallor] : no oral pallor [No Oral Cyanosis] : no oral cyanosis [Normal Jugular Venous A Waves Present] : normal jugular venous A waves present [Normal Jugular Venous V Waves Present] : normal jugular venous V waves present [No Jugular Venous Warren A Waves] : no jugular venous warren A waves [Respiration, Rhythm And Depth] : normal respiratory rhythm and effort [Exaggerated Use Of Accessory Muscles For Inspiration] : no accessory muscle use [Auscultation Breath Sounds / Voice Sounds] : lungs were clear to auscultation bilaterally [Abdomen Soft] : soft [Abdomen Tenderness] : non-tender [Abdomen Mass (___ Cm)] : no abdominal mass palpated [Gait - Sufficient For Exercise Testing] : the gait was sufficient for exercise testing [Abnormal Walk] : normal gait [Nail Clubbing] : no clubbing of the fingernails [Cyanosis, Localized] : no localized cyanosis [Petechial Hemorrhages (___cm)] : no petechial hemorrhages [Skin Color & Pigmentation] : normal skin color and pigmentation [] : no rash [No Venous Stasis] : no venous stasis [Skin Lesions] : no skin lesions [No Skin Ulcers] : no skin ulcer [No Xanthoma] : no  xanthoma was observed [Oriented To Time, Place, And Person] : oriented to person, place, and time [Affect] : the affect was normal [Mood] : the mood was normal [No Anxiety] : not feeling anxious

## 2021-11-18 ENCOUNTER — APPOINTMENT (OUTPATIENT)
Dept: CARDIOLOGY | Facility: CLINIC | Age: 46
End: 2021-11-18

## 2021-11-18 ENCOUNTER — APPOINTMENT (OUTPATIENT)
Dept: FAMILY MEDICINE | Facility: CLINIC | Age: 46
End: 2021-11-18

## 2021-11-23 NOTE — ED ADULT NURSE NOTE - PMH
Instructions prior to your total knee replacement.    -  Your surgery is currently scheduled on Monday, December 6th at 10:15am. Please arrive 2.5 hours prior to your scheduled time. You will be contacted if there is a change in your surgery time.  -  You need a coronavirus test prior to surgery. Your test is scheduled Saturday, 12/4 at 9:30am.   -  Hibiclens soap should be used the night before and the morning of surgery. You will receive a handout with instructions.  -  Do not take aspirin and vitamins 7 days prior to surgery.  -  Complete the clear drink three hours prior to your scheduled procedure.    It is recommended you schedule a follow-up appointment with CARLOS Allred 2-3 weeks after surgery.    Office hours are 8:00 am to 5:00 pm Monday through Friday. If it is urgent that you speak with someone outside of these hours, our [facility] Call Center will be able to assist you. You can reach the office by calling the:    [facility]  [address] [place] [address] [phone] during office hours  [phone] after office hours    We do highly recommend [employer], if you do not already have this. You can request access via the internet or by simply talking with a  at any of the clinics.   [email or web address].    You may receive a survey in the mail from "[employer]. They mail and process patient satisfaction surveys for our clinic. Should you receive a survey, please take a few minutes to rate your experience with your visit.  We value your opinions and insights. Thank you in advance for your time and interest in responding.    Thank you for choosing [facility] as your Orthopaedic provider!      
Essential hypertension    Smoker

## 2021-12-02 ENCOUNTER — APPOINTMENT (OUTPATIENT)
Dept: FAMILY MEDICINE | Facility: CLINIC | Age: 46
End: 2021-12-02
Payer: MEDICAID

## 2021-12-02 VITALS
SYSTOLIC BLOOD PRESSURE: 160 MMHG | DIASTOLIC BLOOD PRESSURE: 88 MMHG | BODY MASS INDEX: 41.05 KG/M2 | WEIGHT: 270 LBS | HEART RATE: 103 BPM | TEMPERATURE: 96.8 F | RESPIRATION RATE: 16 BRPM | OXYGEN SATURATION: 97 %

## 2021-12-02 DIAGNOSIS — N50.89 OTHER SPECIFIED DISORDERS OF THE MALE GENITAL ORGANS: ICD-10-CM

## 2021-12-02 DIAGNOSIS — Z01.818 ENCOUNTER FOR OTHER PREPROCEDURAL EXAMINATION: ICD-10-CM

## 2021-12-02 DIAGNOSIS — Z87.438 PERSONAL HISTORY OF OTHER DISEASES OF MALE GENITAL ORGANS: ICD-10-CM

## 2021-12-02 DIAGNOSIS — Z23 ENCOUNTER FOR IMMUNIZATION: ICD-10-CM

## 2021-12-02 PROCEDURE — 99213 OFFICE O/P EST LOW 20 MIN: CPT

## 2021-12-03 PROBLEM — N50.89 SCROTAL SWELLING: Status: RESOLVED | Noted: 2021-04-16 | Resolved: 2021-12-03

## 2021-12-03 PROBLEM — Z87.438 HISTORY OF HYDROCELE: Status: RESOLVED | Noted: 2021-04-19 | Resolved: 2021-12-03

## 2021-12-03 NOTE — PHYSICAL EXAM
[No Acute Distress] : no acute distress [Normal] : no joint swelling and grossly normal strength and tone [de-identified] : obese

## 2021-12-03 NOTE — HISTORY OF PRESENT ILLNESS
[FreeTextEntry1] : f/u med conditions [de-identified] : 45 y/o PMHX HTN HLD CAD h/o stent placement f/u cardiology s/p surgical excision or right large hydrocele 6/15/2021. Pt here no notes got aggravated when coming in and that is why BP elevated  Pt denies CP SOB palpitations or dizziness notes feels fine

## 2022-04-07 ENCOUNTER — NON-APPOINTMENT (OUTPATIENT)
Age: 47
End: 2022-04-07

## 2022-04-07 ENCOUNTER — APPOINTMENT (OUTPATIENT)
Dept: CARDIOLOGY | Facility: CLINIC | Age: 47
End: 2022-04-07
Payer: MEDICAID

## 2022-04-07 VITALS
RESPIRATION RATE: 18 BRPM | DIASTOLIC BLOOD PRESSURE: 94 MMHG | SYSTOLIC BLOOD PRESSURE: 136 MMHG | HEART RATE: 83 BPM | OXYGEN SATURATION: 96 % | HEIGHT: 68 IN | WEIGHT: 273 LBS | TEMPERATURE: 97 F | BODY MASS INDEX: 41.37 KG/M2

## 2022-04-07 VITALS — HEART RATE: 84 BPM | SYSTOLIC BLOOD PRESSURE: 140 MMHG | DIASTOLIC BLOOD PRESSURE: 82 MMHG

## 2022-04-07 DIAGNOSIS — R53.83 OTHER FATIGUE: ICD-10-CM

## 2022-04-07 PROCEDURE — 93000 ELECTROCARDIOGRAM COMPLETE: CPT

## 2022-04-07 PROCEDURE — 99214 OFFICE O/P EST MOD 30 MIN: CPT

## 2022-04-07 NOTE — REASON FOR VISIT
[Hyperlipidemia] : hyperlipidemia [Hypertension] : hypertension [Coronary Artery Disease] : coronary artery disease [FreeTextEntry1] : Patient returns for followup. Feeling well. Offers no complaints of chest discomfort shortness of breath palpitations dizziness or syncope.Since he was last seen here he states he has begun to exercise and is lost approximately 16 pounds.His most recent lipid profile which predates his weight loss and is from April reveals a total cholesterol 167 HDL 33 \par

## 2022-04-07 NOTE — PHYSICAL EXAM
[General Appearance - Well Developed] : well developed [Normal Appearance] : normal appearance [Well Groomed] : well groomed [General Appearance - Well Nourished] : well nourished [No Deformities] : no deformities [General Appearance - In No Acute Distress] : no acute distress [Normal Oral Mucosa] : normal oral mucosa [No Oral Pallor] : no oral pallor [No Oral Cyanosis] : no oral cyanosis [Normal Jugular Venous A Waves Present] : normal jugular venous A waves present [Normal Jugular Venous V Waves Present] : normal jugular venous V waves present [No Jugular Venous Warren A Waves] : no jugular venous warren A waves [Respiration, Rhythm And Depth] : normal respiratory rhythm and effort [Exaggerated Use Of Accessory Muscles For Inspiration] : no accessory muscle use [Auscultation Breath Sounds / Voice Sounds] : lungs were clear to auscultation bilaterally [Abdomen Soft] : soft [Abdomen Tenderness] : non-tender [Abdomen Mass (___ Cm)] : no abdominal mass palpated [Abnormal Walk] : normal gait [Gait - Sufficient For Exercise Testing] : the gait was sufficient for exercise testing [Nail Clubbing] : no clubbing of the fingernails [Cyanosis, Localized] : no localized cyanosis [Petechial Hemorrhages (___cm)] : no petechial hemorrhages [Skin Color & Pigmentation] : normal skin color and pigmentation [] : no rash [No Venous Stasis] : no venous stasis [Skin Lesions] : no skin lesions [No Skin Ulcers] : no skin ulcer [No Xanthoma] : no  xanthoma was observed [Oriented To Time, Place, And Person] : oriented to person, place, and time [Affect] : the affect was normal [Mood] : the mood was normal [No Anxiety] : not feeling anxious

## 2022-04-07 NOTE — ASSESSMENT
[FreeTextEntry1] : Impression:\par 1. Coronary disease status post stenting now with fatigue\par 2. Hypertension well controlled\par 3. Dyslipidemia not at guidelines at last check\par \par Plan:\par 1.We'll begin vasodilator myocardial perfusion imaging as patient is unable to go on a treadmill due to her hip issues\par 2. Repeat lipid profile liver profile and CPK\par 3. If no ischemia on stress testing might consider tapering and/or discontinuing carvedilol as a possible etiology of his fatigue

## 2022-04-07 NOTE — ASSESSMENT
[FreeTextEntry1] : Impression:\par 1. Coronary disease status post stenting currently stable\par 2. Hypertension well controlled\par 3. Dyslipidemia not at guidelines\par \par Plan:\par 1. I had a long discussion with the patient and which we discussed either increasing his atorvastatin 80 mg or adding Zetia. I told him that I preferred adding Zetia as it will give him a better chance to reach LDL goal of 70 and with less potential side effects. The patient is hoping to continue on his diet and exercise and get closer to goal and would prefer not to alter his medical regimen at this time. He is aware of the risks and benefits. He will have a repeat lipid profile in 3 months time and if not at goal we'll then alter medical therapy

## 2022-04-07 NOTE — REASON FOR VISIT
[Coronary Artery Disease] : coronary artery disease [FreeTextEntry1] : Patient returns for followup. He has no complaints of chest discomfort or shortness of breath. He does however complain of increasing fatigue in recent months. He had his lisinopril increased by his primary physician but no changes are made in his lipid-lowering therapy

## 2022-05-12 ENCOUNTER — APPOINTMENT (OUTPATIENT)
Dept: CARDIOLOGY | Facility: CLINIC | Age: 47
End: 2022-05-12
Payer: MEDICAID

## 2022-05-12 LAB
ALBUMIN SERPL ELPH-MCNC: 4.5 G/DL
ALP BLD-CCNC: 133 U/L
ALT SERPL-CCNC: 23 U/L
AST SERPL-CCNC: 25 U/L
BILIRUB DIRECT SERPL-MCNC: 0.1 MG/DL
BILIRUB INDIRECT SERPL-MCNC: 0.4 MG/DL
BILIRUB SERPL-MCNC: 0.5 MG/DL
CHOLEST SERPL-MCNC: 222 MG/DL
CK SERPL-CCNC: 529 U/L
HDLC SERPL-MCNC: 39 MG/DL
LDLC SERPL CALC-MCNC: 165 MG/DL
NONHDLC SERPL-MCNC: 182 MG/DL
PROT SERPL-MCNC: 8 G/DL
TRIGL SERPL-MCNC: 87 MG/DL

## 2022-05-12 PROCEDURE — A9500: CPT

## 2022-05-12 PROCEDURE — 99214 OFFICE O/P EST MOD 30 MIN: CPT

## 2022-05-12 PROCEDURE — 78452 HT MUSCLE IMAGE SPECT MULT: CPT

## 2022-05-12 PROCEDURE — 93015 CV STRESS TEST SUPVJ I&R: CPT

## 2022-05-12 PROCEDURE — 93000 ELECTROCARDIOGRAM COMPLETE: CPT | Mod: 59

## 2022-05-12 NOTE — ASSESSMENT
[FreeTextEntry1] : Impression:\par 1. Coronary disease status post remote stenting currently asymptomatic with results of today's vasodilator myocardial perfusion imaging pending \par  2. Dyslipidemia not at goal even on significant dose of statin and now with elevated CPK\par \par Plan:\par 1. Discontinue atorvastatin for now\par 2. Repeat lipid profile liver profile and CPK in one month's time\par 3 will reassess and decide whether or not to go back to low-dose statin with Zetia or try another statin or even consider an injectable

## 2022-05-12 NOTE — REASON FOR VISIT
[FreeTextEntry1] : Patient presented for  vasodilator myocardial perfusion imaging. He had recent lipid profile which revealed a CPK of 529.His most recent lipid profile even on  increased dose of atorvastatin was total cholesterol 229 hdl 39 and .Denies any specific muscle aches but has has occasional low back pain

## 2022-08-04 ENCOUNTER — NON-APPOINTMENT (OUTPATIENT)
Age: 47
End: 2022-08-04

## 2022-08-16 NOTE — BRIEF OPERATIVE NOTE - CONDITION POST OP
stable Referred To Plastics For Closure Text (Leave Blank If You Do Not Want): After obtaining clear surgical margins the patient was sent to plastics for surgical repair.  The patient understands they will receive post-surgical care and follow-up from the referring physician's office.

## 2022-09-20 ENCOUNTER — APPOINTMENT (OUTPATIENT)
Dept: FAMILY MEDICINE | Facility: CLINIC | Age: 47
End: 2022-09-20

## 2022-09-20 VITALS — DIASTOLIC BLOOD PRESSURE: 78 MMHG | SYSTOLIC BLOOD PRESSURE: 128 MMHG

## 2022-09-20 VITALS
SYSTOLIC BLOOD PRESSURE: 125 MMHG | WEIGHT: 264 LBS | TEMPERATURE: 97.1 F | OXYGEN SATURATION: 98 % | BODY MASS INDEX: 40.14 KG/M2 | DIASTOLIC BLOOD PRESSURE: 77 MMHG | RESPIRATION RATE: 16 BRPM | HEART RATE: 80 BPM

## 2022-09-20 DIAGNOSIS — I25.10 ATHEROSCLEROTIC HEART DISEASE OF NATIVE CORONARY ARTERY W/OUT ANGINA PECTORIS: ICD-10-CM

## 2022-09-20 DIAGNOSIS — E78.5 HYPERLIPIDEMIA, UNSPECIFIED: ICD-10-CM

## 2022-09-20 DIAGNOSIS — I10 ESSENTIAL (PRIMARY) HYPERTENSION: ICD-10-CM

## 2022-09-20 DIAGNOSIS — R73.03 PREDIABETES.: ICD-10-CM

## 2022-09-20 PROCEDURE — 99213 OFFICE O/P EST LOW 20 MIN: CPT

## 2022-09-21 LAB
ALBUMIN SERPL ELPH-MCNC: 4.6 G/DL
ALP BLD-CCNC: 127 U/L
ALT SERPL-CCNC: 23 U/L
ANION GAP SERPL CALC-SCNC: 12 MMOL/L
AST SERPL-CCNC: 17 U/L
BILIRUB SERPL-MCNC: 0.4 MG/DL
BUN SERPL-MCNC: 10 MG/DL
CALCIUM SERPL-MCNC: 9.8 MG/DL
CHLORIDE SERPL-SCNC: 101 MMOL/L
CHOLEST SERPL-MCNC: 220 MG/DL
CK SERPL-CCNC: 272 U/L
CO2 SERPL-SCNC: 27 MMOL/L
CREAT SERPL-MCNC: 0.89 MG/DL
EGFR: 108 ML/MIN/1.73M2
ESTIMATED AVERAGE GLUCOSE: 137 MG/DL
GLUCOSE SERPL-MCNC: 72 MG/DL
HBA1C MFR BLD HPLC: 6.4 %
HDLC SERPL-MCNC: 41 MG/DL
LDLC SERPL CALC-MCNC: 157 MG/DL
NONHDLC SERPL-MCNC: 180 MG/DL
POTASSIUM SERPL-SCNC: 5.3 MMOL/L
PROT SERPL-MCNC: 7.5 G/DL
SODIUM SERPL-SCNC: 139 MMOL/L
TRIGL SERPL-MCNC: 113 MG/DL
TSH SERPL-ACNC: 1.57 UIU/ML

## 2022-09-21 NOTE — PHYSICAL EXAM
[No Acute Distress] : no acute distress [Well-Appearing] : well-appearing [Soft] : abdomen soft [Non Tender] : non-tender [Normal] : no joint swelling and grossly normal strength and tone

## 2022-10-10 NOTE — PHYSICAL EXAM
Future appt:    Last Appointment with provider:   8/2/2022 Physical  Last appointment at [facility]:  9/7/2022  Cholesterol, Total (mg/dL)   Date Value   08/02/2022 197     HDL Cholesterol (mg/dL)   Date Value   08/02/2022 77 (H)     LDL Cholesterol (mg/dL)   Date Value   08/02/2022 103 (H)     Triglycerides (mg/dL)   Date Value   08/02/2022 95     Lab Results   Component Value Date     03/16/2020    A1C 5.2 03/16/2020     Lab Results   Component Value Date    TSH 2.600 07/27/2021       No follow-ups on file. [Normal] : normal rate, regular rhythm, normal S1 and S2 and no murmur heard [Soft] : abdomen soft [Non Tender] : non-tender [Non-distended] : non-distended [No Masses] : no abdominal mass palpated [de-identified] : short narrow neck [de-identified] : central obesity

## 2022-10-26 NOTE — PATIENT PROFILE ADULT - FUNCTIONAL SCREEN CURRENT LEVEL: BATHING, MLM
Left a message to return call,Hub may schedule.    Left a second message to return call.    Left a third message to return call.  
0 = independent

## 2023-03-23 RX ORDER — LISINOPRIL 10 MG/1
10 TABLET ORAL DAILY
Qty: 90 | Refills: 3 | Status: ACTIVE | COMMUNITY
Start: 2021-04-16 | End: 1900-01-01

## 2023-03-23 RX ORDER — CARVEDILOL 3.12 MG/1
3.12 TABLET, FILM COATED ORAL
Qty: 180 | Refills: 3 | Status: ACTIVE | COMMUNITY
Start: 2021-04-16 | End: 1900-01-01

## 2023-03-23 RX ORDER — ATORVASTATIN CALCIUM 40 MG/1
40 TABLET, FILM COATED ORAL
Qty: 90 | Refills: 3 | Status: ACTIVE | COMMUNITY
Start: 2021-04-16 | End: 1900-01-01

## 2023-03-27 ENCOUNTER — EMERGENCY (EMERGENCY)
Facility: HOSPITAL | Age: 48
LOS: 1 days | Discharge: HOME | End: 2023-03-27
Attending: INTERNAL MEDICINE | Admitting: INTERNAL MEDICINE
Payer: MEDICAID

## 2023-03-27 ENCOUNTER — EMERGENCY (EMERGENCY)
Facility: HOSPITAL | Age: 48
LOS: 1 days | Discharge: HOME | End: 2023-03-27
Admitting: EMERGENCY MEDICINE
Payer: MEDICAID

## 2023-03-27 VITALS
WEIGHT: 270.07 LBS | SYSTOLIC BLOOD PRESSURE: 176 MMHG | HEART RATE: 73 BPM | TEMPERATURE: 97 F | RESPIRATION RATE: 15 BRPM | DIASTOLIC BLOOD PRESSURE: 95 MMHG | HEIGHT: 68 IN | OXYGEN SATURATION: 97 %

## 2023-03-27 VITALS
HEART RATE: 69 BPM | OXYGEN SATURATION: 97 % | DIASTOLIC BLOOD PRESSURE: 119 MMHG | TEMPERATURE: 98 F | HEIGHT: 68 IN | RESPIRATION RATE: 18 BRPM | SYSTOLIC BLOOD PRESSURE: 189 MMHG

## 2023-03-27 DIAGNOSIS — Z95.5 PRESENCE OF CORONARY ANGIOPLASTY IMPLANT AND GRAFT: Chronic | ICD-10-CM

## 2023-03-27 PROCEDURE — L9991: CPT | Mod: 1L

## 2023-03-27 PROCEDURE — 99284 EMERGENCY DEPT VISIT MOD MDM: CPT

## 2023-03-27 PROCEDURE — 70480 CT ORBIT/EAR/FOSSA W/O DYE: CPT | Mod: 26,MA

## 2023-03-27 PROCEDURE — 70480 CT ORBIT/EAR/FOSSA W/O DYE: CPT | Mod: MA

## 2023-03-27 RX ORDER — IBUPROFEN 200 MG
600 TABLET ORAL ONCE
Refills: 0 | Status: COMPLETED | OUTPATIENT
Start: 2023-03-27 | End: 2023-03-27

## 2023-03-27 RX ADMIN — Medication 600 MILLIGRAM(S): at 16:08

## 2023-03-27 NOTE — ED PROVIDER NOTE - PATIENT PORTAL LINK FT
You can access the FollowMyHealth Patient Portal offered by [facility] by registering at the following website: [email or web address]. By joining [employer]’s FollowMyHealth portal, you will also be able to view your health information using other applications (apps) compatible with our system.

## 2023-03-27 NOTE — ED PROVIDER NOTE - PHYSICAL EXAMINATION
General:     NAD, well-nourished, well-appearing  Head:     NC/AT, EOMI, oral mucosa moist  heent Left eye no fluorescein uptake corneas clear vision is 20/25 pressure 15 right eye is normal  Neck:     trachea midline  Lungs:     CTA b/l, no w/r/r  CVS:     S1S2, RRR, no m/g/r  Abd:     +BS, s/nt/nd, no organomegaly  Ext:    2+ radial and pedal pulses, no c/c/e  Neuro: AAOx3, no sensory/motor deficits
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
## 2023-03-27 NOTE — ED ADULT NURSE NOTE - NSICDXPASTMEDICALHX_GEN_ALL_CORE_FT
PAST MEDICAL HISTORY:  Essential hypertension     HLD (hyperlipidemia)     Hydrocele, unspecified     MI (myocardial infarction) 2018    Smoker quit in 2019
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
## 2023-03-27 NOTE — ED PROVIDER NOTE - NSFOLLOWUPINSTRUCTIONS_ED_ALL_ED_FT
Follow up with your PMD within 1-2 days.  Rest, increase your fluids, advance your activity as tolerated.   Take all of your other medications as previously prescribed.   Worsening, continued or ANY new concerning symptoms return to the emergency department.  The patient is clinically sober, A&Ox3, free from distracting injury.  Throughout our interactions in the ED today, the pt has demonstrated concrete thinking/reasoning, has maintained an orderly/reasonable conversation, appears to have intact insight/judgment/reason and therefore in our opinion has capacity to make decisions.  Given the patient's presentation, we have communicated our concern for disability with L eye___________.    The patient has verbalized an understanding of our concerns and still requests to leave against medical advice.  We have discussed the range of possible diagnoses, potential testing & treatment options.  We’ve made  numerous efforts to prevent the patient from leaving AMA.  Our discussions included the potential outcomes of leaving AMA, including worsening of their condition, becoming permanently disabled/in pain/critically ill, or death.  Despite these efforts, we were unable to convince the patient to stay.  The patient is refusing any  further care and is leaving against medical advice.  We have answered all questions and have implored the patient to return ASAP to complete the work up or with ANY worsening or new concerning symptoms.  Staff member _RN _________ witnessed the patient consenting to AMA.  Patient is recommended to go to the [facility] emergency room with done with a personal task
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
## 2023-03-27 NOTE — ED ADULT NURSE NOTE - SUICIDE SCREENING DEPRESSION
I have personally provided the amount of critical care time documented below concurrently with the resident/fellow.  This time excludes time spent on separate procedures and time spent teaching. I have reviewed the resident’s / fellow’s documentation and I agree with the history, exam, and assessment and plan of care.
Negative

## 2023-03-27 NOTE — ED ADULT TRIAGE NOTE - INTERNATIONAL TRAVEL
[de-identified] : Patient is a 59-year-old female presenting for evaluation of longstanding left knee pain.  Her left knee pain is localized anteriorly and medially.  Patient states that her knee pain is worse with weightbearing activity including walking any distance, rising from seated position, and using stairs.  Patient does admit to frequent falls as she feels the knee is weak and unstable.  She admits to intermittent swelling of the knee.  Patient denies any prior surgery for the knee.  She has been on physical therapy without significant benefit.  Patient's been seen by pain management has done one steroid injection which was 1 month ago with improvement of her symptoms. SHe has been taking Tylenol and aspirin for pain with little relief. Patient was referred to us by her pain management doctor for evaluation of left knee pain. No

## 2023-03-28 NOTE — ED PROVIDER NOTE - PATIENT PORTAL LINK FT
You can access the FollowMyHealth Patient Portal offered by [facility] by registering at the following website: [email or web address]. By joining [employer]’s FollowMyHealth portal, you will also be able to view your health information using other applications (apps) compatible with our system.

## 2023-03-28 NOTE — ED PROVIDER NOTE - PROGRESS NOTE DETAILS
Dr. Ornelas: I was not involved in the care of this patient and am only entering information to back log for downtime that took place during this patient's care.

## 2023-03-28 NOTE — ED PROVIDER NOTE - OBJECTIVE STATEMENT
NIKKI Sabillon: I was not involved in the care of this patient and am only entering information to back log for downtime that took place starting 3/24/23 at 2300 and carrying through 3/28/23.

## 2023-04-24 NOTE — H&P ADULT - NSICDXFAMILYHX_GEN_ALL_CORE_FT
FAMILY HISTORY:  CAD (coronary artery disease), mother had CABG ()
Specific interventions were implemented:

## 2023-10-07 ENCOUNTER — EMERGENCY (EMERGENCY)
Facility: HOSPITAL | Age: 48
LOS: 1 days | Discharge: HOME | End: 2023-10-07
Attending: EMERGENCY MEDICINE | Admitting: EMERGENCY MEDICINE
Payer: SELF-PAY

## 2023-10-07 VITALS
RESPIRATION RATE: 18 BRPM | DIASTOLIC BLOOD PRESSURE: 108 MMHG | OXYGEN SATURATION: 98 % | SYSTOLIC BLOOD PRESSURE: 177 MMHG | HEIGHT: 68 IN | HEART RATE: 87 BPM | TEMPERATURE: 98 F | WEIGHT: 268.96 LBS

## 2023-10-07 VITALS
SYSTOLIC BLOOD PRESSURE: 159 MMHG | RESPIRATION RATE: 18 BRPM | OXYGEN SATURATION: 99 % | HEART RATE: 85 BPM | DIASTOLIC BLOOD PRESSURE: 89 MMHG

## 2023-10-07 DIAGNOSIS — Z95.5 PRESENCE OF CORONARY ANGIOPLASTY IMPLANT AND GRAFT: Chronic | ICD-10-CM

## 2023-10-07 PROCEDURE — 99283 EMERGENCY DEPT VISIT LOW MDM: CPT

## 2023-10-07 PROCEDURE — 99053 MED SERV 10PM-8AM 24 HR FAC: CPT

## 2023-10-07 RX ORDER — IBUPROFEN 200 MG
600 TABLET ORAL ONCE
Refills: 0 | Status: COMPLETED | OUTPATIENT
Start: 2023-10-07 | End: 2023-10-07

## 2023-10-07 RX ORDER — AZITHROMYCIN 500 MG/1
1 TABLET, FILM COATED ORAL
Qty: 4 | Refills: 0
Start: 2023-10-07 | End: 2023-10-10

## 2023-10-07 RX ORDER — AZITHROMYCIN 500 MG/1
500 TABLET, FILM COATED ORAL ONCE
Refills: 0 | Status: COMPLETED | OUTPATIENT
Start: 2023-10-07 | End: 2023-10-07

## 2023-10-07 RX ORDER — IBUPROFEN 200 MG
1 TABLET ORAL
Qty: 12 | Refills: 0
Start: 2023-10-07 | End: 2023-10-09

## 2023-10-07 RX ADMIN — AZITHROMYCIN 500 MILLIGRAM(S): 500 TABLET, FILM COATED ORAL at 00:53

## 2023-10-07 RX ADMIN — Medication 600 MILLIGRAM(S): at 00:53

## 2023-10-07 RX ADMIN — Medication 600 MILLIGRAM(S): at 01:01

## 2023-10-07 NOTE — ED ADULT NURSE NOTE - OBJECTIVE STATEMENT
pt came into the ED stating "I had a fever and a sore throat and now has pain in his left eat and face".

## 2023-10-07 NOTE — ED PROVIDER NOTE - NSFOLLOWUPINSTRUCTIONS_ED_ALL_ED_FT
Respiratory Infection    A  respiratory infection is an illness that affects parts of the body used for breathing, like the lungs, nose, and throat. It is caused by a germ called a virus. Symptoms can include runny nose, coughing, sneezing, fatigue, body aches, sore throat, fever, or headache. Over the counter medicine can be used to manage the symptoms but the infection typically goes away on its own in 5 to 10 days.   drink lot of liquids,  Take Zithromx as prescribed  Hypertension    Hypertension, commonly called high blood pressure, is when the force of blood pumping through your arteries is too strong. Hypertension forces your heart to work harder to pump blood. Your arteries may become narrow or stiff. Having untreated or uncontrolled hypertension for a long period of time can cause heart attack, stroke, kidney disease, and other problems. If started on a medication, take exactly as prescribed by your health care professional. Maintain a healthy lifestyle and follow up with your primary care physician.    SEEK IMMEDIATE MEDICAL CARE IF YOU HAVE ANY OF THE FOLLOWING SYMPTOMS: severe headache, confusion, chest pain, abdominal pain, vomiting, or shortness of breath.        SEEK IMMEDIATE MEDICAL CARE IF YOU HAVE ANY OF THE FOLLOWING SYMPTOMS: shortness of breath, chest pain, fever over 10 days, or lightheadedness/dizziness.

## 2023-10-07 NOTE — ED PROVIDER NOTE - OBJECTIVE STATEMENT
49 y/o Male with no sig PMHx presents to ED c/o cough and sore throat for 3 days , no fever, no difficulty eating

## 2023-10-07 NOTE — ED PROVIDER NOTE - PATIENT PORTAL LINK FT
You can access the FollowMyHealth Patient Portal offered by [facility] by registering at the following website: [email or web address]. By joining [employer]’s FollowMyHealth portal, you will also be able to view your health information using other applications (apps) compatible with our system.

## 2023-10-08 ENCOUNTER — EMERGENCY (EMERGENCY)
Facility: HOSPITAL | Age: 48
LOS: 1 days | Discharge: ANOTHER HOSPITAL | End: 2023-10-08
Attending: STUDENT IN AN ORGANIZED HEALTH CARE EDUCATION/TRAINING PROGRAM | Admitting: STUDENT IN AN ORGANIZED HEALTH CARE EDUCATION/TRAINING PROGRAM
Payer: SELF-PAY

## 2023-10-08 ENCOUNTER — EMERGENCY (EMERGENCY)
Facility: HOSPITAL | Age: 48
LOS: 1 days | Discharge: HOME | End: 2023-10-08
Attending: STUDENT IN AN ORGANIZED HEALTH CARE EDUCATION/TRAINING PROGRAM | Admitting: EMERGENCY MEDICINE
Payer: SELF-PAY

## 2023-10-08 VITALS
HEIGHT: 68 IN | TEMPERATURE: 101 F | HEART RATE: 102 BPM | SYSTOLIC BLOOD PRESSURE: 164 MMHG | WEIGHT: 259.93 LBS | RESPIRATION RATE: 19 BRPM | OXYGEN SATURATION: 96 % | DIASTOLIC BLOOD PRESSURE: 112 MMHG

## 2023-10-08 VITALS
SYSTOLIC BLOOD PRESSURE: 146 MMHG | TEMPERATURE: 98 F | DIASTOLIC BLOOD PRESSURE: 88 MMHG | HEIGHT: 68 IN | OXYGEN SATURATION: 95 % | HEART RATE: 87 BPM | RESPIRATION RATE: 18 BRPM

## 2023-10-08 VITALS
SYSTOLIC BLOOD PRESSURE: 170 MMHG | HEART RATE: 98 BPM | TEMPERATURE: 98 F | RESPIRATION RATE: 18 BRPM | DIASTOLIC BLOOD PRESSURE: 94 MMHG | OXYGEN SATURATION: 98 %

## 2023-10-08 DIAGNOSIS — Z95.5 PRESENCE OF CORONARY ANGIOPLASTY IMPLANT AND GRAFT: Chronic | ICD-10-CM

## 2023-10-08 LAB
ALBUMIN SERPL ELPH-MCNC: 3.5 G/DL — SIGNIFICANT CHANGE UP (ref 3.3–5)
ALP SERPL-CCNC: 127 U/L — HIGH (ref 40–120)
ALT FLD-CCNC: 35 U/L — SIGNIFICANT CHANGE UP (ref 10–45)
ANION GAP SERPL CALC-SCNC: 8 MMOL/L — SIGNIFICANT CHANGE UP (ref 5–17)
AST SERPL-CCNC: 20 U/L — SIGNIFICANT CHANGE UP (ref 10–40)
BASOPHILS # BLD AUTO: 0.06 K/UL — SIGNIFICANT CHANGE UP (ref 0–0.2)
BASOPHILS NFR BLD AUTO: 0.4 % — SIGNIFICANT CHANGE UP (ref 0–2)
BILIRUB SERPL-MCNC: 0.7 MG/DL — SIGNIFICANT CHANGE UP (ref 0.2–1.2)
BUN SERPL-MCNC: 7 MG/DL — SIGNIFICANT CHANGE UP (ref 7–23)
CALCIUM SERPL-MCNC: 9.4 MG/DL — SIGNIFICANT CHANGE UP (ref 8.4–10.5)
CHLORIDE SERPL-SCNC: 102 MMOL/L — SIGNIFICANT CHANGE UP (ref 96–108)
CO2 SERPL-SCNC: 28 MMOL/L — SIGNIFICANT CHANGE UP (ref 22–31)
CREAT SERPL-MCNC: 1.15 MG/DL — SIGNIFICANT CHANGE UP (ref 0.5–1.3)
EGFR: 80 ML/MIN/1.73M2 — SIGNIFICANT CHANGE UP
EOSINOPHIL # BLD AUTO: 0.02 K/UL — SIGNIFICANT CHANGE UP (ref 0–0.5)
EOSINOPHIL NFR BLD AUTO: 0.1 % — SIGNIFICANT CHANGE UP (ref 0–6)
GLUCOSE SERPL-MCNC: 123 MG/DL — HIGH (ref 70–99)
HCT VFR BLD CALC: 43 % — SIGNIFICANT CHANGE UP (ref 39–50)
HGB BLD-MCNC: 14.4 G/DL — SIGNIFICANT CHANGE UP (ref 13–17)
IMM GRANULOCYTES NFR BLD AUTO: 0.6 % — SIGNIFICANT CHANGE UP (ref 0–0.9)
LYMPHOCYTES # BLD AUTO: 1.86 K/UL — SIGNIFICANT CHANGE UP (ref 1–3.3)
LYMPHOCYTES # BLD AUTO: 10.9 % — LOW (ref 13–44)
MCHC RBC-ENTMCNC: 27.3 PG — SIGNIFICANT CHANGE UP (ref 27–34)
MCHC RBC-ENTMCNC: 33.5 GM/DL — SIGNIFICANT CHANGE UP (ref 32–36)
MCV RBC AUTO: 81.4 FL — SIGNIFICANT CHANGE UP (ref 80–100)
MONOCYTES # BLD AUTO: 1.28 K/UL — HIGH (ref 0–0.9)
MONOCYTES NFR BLD AUTO: 7.5 % — SIGNIFICANT CHANGE UP (ref 2–14)
NEUTROPHILS # BLD AUTO: 13.7 K/UL — HIGH (ref 1.8–7.4)
NEUTROPHILS NFR BLD AUTO: 80.5 % — HIGH (ref 43–77)
NRBC # BLD: 0 /100 WBCS — SIGNIFICANT CHANGE UP (ref 0–0)
PLATELET # BLD AUTO: 398 K/UL — SIGNIFICANT CHANGE UP (ref 150–400)
POTASSIUM SERPL-MCNC: 4.2 MMOL/L — SIGNIFICANT CHANGE UP (ref 3.5–5.3)
POTASSIUM SERPL-SCNC: 4.2 MMOL/L — SIGNIFICANT CHANGE UP (ref 3.5–5.3)
PROT SERPL-MCNC: 9.2 G/DL — HIGH (ref 6–8.3)
RAPID RVP RESULT: SIGNIFICANT CHANGE UP
RBC # BLD: 5.28 M/UL — SIGNIFICANT CHANGE UP (ref 4.2–5.8)
RBC # FLD: 13.2 % — SIGNIFICANT CHANGE UP (ref 10.3–14.5)
SARS-COV-2 RNA SPEC QL NAA+PROBE: SIGNIFICANT CHANGE UP
SODIUM SERPL-SCNC: 138 MMOL/L — SIGNIFICANT CHANGE UP (ref 135–145)
WBC # BLD: 17.02 K/UL — HIGH (ref 3.8–10.5)
WBC # FLD AUTO: 17.02 K/UL — HIGH (ref 3.8–10.5)

## 2023-10-08 PROCEDURE — 70491 CT SOFT TISSUE NECK W/DYE: CPT | Mod: MA

## 2023-10-08 PROCEDURE — 96375 TX/PRO/DX INJ NEW DRUG ADDON: CPT | Mod: XU

## 2023-10-08 PROCEDURE — 0225U NFCT DS DNA&RNA 21 SARSCOV2: CPT

## 2023-10-08 PROCEDURE — 80053 COMPREHEN METABOLIC PANEL: CPT

## 2023-10-08 PROCEDURE — 96374 THER/PROPH/DIAG INJ IV PUSH: CPT | Mod: XU

## 2023-10-08 PROCEDURE — 99284 EMERGENCY DEPT VISIT MOD MDM: CPT

## 2023-10-08 PROCEDURE — 99053 MED SERV 10PM-8AM 24 HR FAC: CPT

## 2023-10-08 PROCEDURE — 36415 COLL VENOUS BLD VENIPUNCTURE: CPT

## 2023-10-08 PROCEDURE — 99285 EMERGENCY DEPT VISIT HI MDM: CPT | Mod: 25

## 2023-10-08 PROCEDURE — 93010 ELECTROCARDIOGRAM REPORT: CPT

## 2023-10-08 PROCEDURE — 70491 CT SOFT TISSUE NECK W/DYE: CPT | Mod: 26,MA

## 2023-10-08 PROCEDURE — 85025 COMPLETE CBC W/AUTO DIFF WBC: CPT

## 2023-10-08 PROCEDURE — 99285 EMERGENCY DEPT VISIT HI MDM: CPT

## 2023-10-08 RX ORDER — KETOROLAC TROMETHAMINE 30 MG/ML
15 SYRINGE (ML) INJECTION ONCE
Refills: 0 | Status: DISCONTINUED | OUTPATIENT
Start: 2023-10-08 | End: 2023-10-08

## 2023-10-08 RX ORDER — SODIUM CHLORIDE 9 MG/ML
1000 INJECTION INTRAMUSCULAR; INTRAVENOUS; SUBCUTANEOUS ONCE
Refills: 0 | Status: COMPLETED | OUTPATIENT
Start: 2023-10-08 | End: 2023-10-08

## 2023-10-08 RX ADMIN — Medication 125 MILLIGRAM(S): at 16:02

## 2023-10-08 RX ADMIN — Medication 15 MILLIGRAM(S): at 17:14

## 2023-10-08 RX ADMIN — Medication 100 MILLIGRAM(S): at 18:33

## 2023-10-08 RX ADMIN — SODIUM CHLORIDE 1000 MILLILITER(S): 9 INJECTION INTRAMUSCULAR; INTRAVENOUS; SUBCUTANEOUS at 16:02

## 2023-10-08 NOTE — ED ADULT TRIAGE NOTE - BIRTH SEX
Patient was given 500 MG Cipro for prophylaxis before cystoscopy.  The patient was prepped and draped in the usual sterile fashion. The urethra was anesthetized using 2% lidocaine jelly local.     Male

## 2023-10-08 NOTE — ED ADULT TRIAGE NOTE - CHIEF COMPLAINT QUOTE
Patient c/o sore throat and inability to swallow medications. Patient states "I couldn't take my blood pressure medication this morning because I cant swallow the pills".

## 2023-10-08 NOTE — ED ADULT TRIAGE NOTE - CHIEF COMPLAINT QUOTE
Pt a transfer from [place] for ENT. Patient has abscess on left side of neck. Pt c/o diff swallowing and sob. Pt appears comfortable. Pt denies chest pain sob. PHX cardiac stents, HTN

## 2023-10-08 NOTE — ED ADULT NURSE NOTE - NSFALLUNIVINTERV_ED_ALL_ED
Bed/Stretcher in lowest position, wheels locked, appropriate side rails in place/Call bell, personal items and telephone in reach/Instruct patient to call for assistance before getting out of bed/chair/stretcher/Non-slip footwear applied when patient is off stretcher/[place] to call system/Physically safe environment - no spills, clutter or unnecessary equipment/Purposeful proactive rounding/Room/bathroom lighting operational, light cord in reach

## 2023-10-08 NOTE — ED PROVIDER NOTE - PHYSICAL EXAMINATION
VITAL SIGNS: I have reviewed nursing notes and confirm.   GEN: Well-developed; well-nourished; in mild acute distress. Speaking full sentences. (+) muffled voice. NO drooling. NO tongue swelling. NO lip swelling.  SKIN: Warm, pink, no rash, no diaphoresis, no cyanosis, well perfused.   HEAD: Normocephalic; atraumatic. No scalp lacerations, no abrasions.  NECK: Supple; non tender. (+) LEFT sided ttp mild.  EYES: Pupils 3mm equal, round, reactive to light and accomodation, conjunctiva and sclera clear. Extra-ocular movements intact bilaterally.  ENT: No nasal discharge; airway clear. Trachea is midline. ORAL: No oropharyngeal exudates or erythema. Normal dentition.  CV: Regular rate and rhythm. S1, S2 normal; no murmurs, gallops, or rubs. No lower extremity pitting edema bilaterally. Capillary refill < 2 seconds throughout. Distal pulses intact 2+ throughout.  RESP: CTA bilaterally. No wheezes, rales, or rhonchi.   ABD: Normal bowel sounds, soft, non-distended, non-tender, no rebound, no guarding, no rigidity, no hepatosplenomegaly. No CVA tenderness bilaterally.  MSK: Normal range of motion and movement of all 4 extremities. No joint or muscular pain throughout. No clubbing.   BACK: No thoracolumbar midline or paravertebral tenderness. No step-offs or obvious deformities.  NEURO: Alert & oriented x 3, Grossly unremarkable. Sensory and motor intact throughout. No focal deficits. Normal speech and coordination.   PSYCH: Cooperative, appropriate.

## 2023-10-08 NOTE — ED PROVIDER NOTE - OBJECTIVE STATEMENT
48M PMHx of CAD s/p stenting; presenting with sore throat and difficulty swallowing x 2-3 days. Describes Left sided neck pain, difficulty swallowing a/w fever and chills. Worsening difficulty breathing when laying flat. Otherwise speaking full sentences. Denies any chest pain, abdominal pain, shortness of breath, nausea/vomiting, headaches,   diarrhea ,constipation, weakness, syncope, hematuria, dysuria, urinary symptoms, subjective neurological deficits.

## 2023-10-08 NOTE — ED PROVIDER NOTE - CLINICAL SUMMARY MEDICAL DECISION MAKING FREE TEXT BOX
Pt p/w with sore throat for few days. (+) LEFT sided neck pain, (+) muffled voice, (+) difficulty and pain swallowing. No history of immunocompromise. Well appearing and hemodynamically stable in ED, saturating 99% RA. Patient is euvolemic w/ no trismus, no drooling, no dysphagia, no odynophagia, no airway compromise, no neck mass,  , no lip swelling, no tongue swelling, no cyanosis, no raised tongue, no "woody" or brawny texture to floor of mouth, no erythema to floor of mouth. Able to tolerate PO intake and secretions.   Considered DDX but not consistent with presentation: Zac's angina, Streptococcal pharyngitis, Bacterial Tracheitis, infectious mononucleosis, Angioedema, retropharyngeal abscess, peritonsillar abscess, epiglottitis, penetrating traumatic injury, parotitits, acute necrotizing ulcerative gingivitis, caustic ingestion.  PLAN:  - methylprednisolone 125mg IV, IVF.  - Labs, CBC showing 17k, CT neck showing LEFT peritonsillar abscess   - s/p clindamycin ivpb  - To be transferred for ENT.

## 2023-10-09 VITALS
OXYGEN SATURATION: 100 % | DIASTOLIC BLOOD PRESSURE: 85 MMHG | RESPIRATION RATE: 16 BRPM | HEART RATE: 88 BPM | TEMPERATURE: 98 F | SYSTOLIC BLOOD PRESSURE: 188 MMHG

## 2023-10-09 RX ORDER — LIDOCAINE HYDROCHLORIDE AND EPINEPHRINE 10; 10 MG/ML; UG/ML
20 INJECTION, SOLUTION INFILTRATION; PERINEURAL ONCE
Refills: 0 | Status: COMPLETED | OUTPATIENT
Start: 2023-10-09 | End: 2023-10-09

## 2023-10-09 RX ORDER — DEXAMETHASONE 0.5 MG/5ML
10 ELIXIR ORAL ONCE
Refills: 0 | Status: COMPLETED | OUTPATIENT
Start: 2023-10-09 | End: 2023-10-09

## 2023-10-09 RX ADMIN — Medication 102 MILLIGRAM(S): at 02:38

## 2023-10-09 RX ADMIN — LIDOCAINE HYDROCHLORIDE AND EPINEPHRINE 20 MILLILITER(S): 10; 10 INJECTION, SOLUTION INFILTRATION; PERINEURAL at 02:33

## 2023-10-09 RX ADMIN — Medication 100 MILLIGRAM(S): at 00:55

## 2023-10-09 NOTE — ED PROVIDER NOTE - ATTENDING CONTRIBUTION TO CARE
48-year-old male, past medical history of CAD status post stents, MI, hyperlipidemia, and hypertension, transferred from [facility] for ENT eval of left peritonsillar phlegmon/abscess.  Patient reports has been having left-sided neck pain/swelling, difficulty swallowing, sore throat and fever/chills x2-3 days.  Patient still able to tolerate p.o. and speaking in full sentences.  Patient does endorse worsening difficulty breathing when lying flat.  Patient denies headache, chest pain, abdominal pain, shortness of breath, nausea/vomiting, urinary symptoms, weakness/numbness, lightheadedness/dizziness or any other symptoms at this time.    Vital signs stable.  Physical exam significant for mild left-sided facial/neck swelling and tenderness to palpation.  Patient is still nontoxic-appearing.  Extraocular movements intact.  Pupils equal round and reactive to light.  Mucous membranes are moist.  Trachea midline.  Uvula midline. small left-sided peritonsillar erythema noted.  No exudate or purulent drainage.  Normal dentition.  Heart is regular rate and rhythm.  Lungs clear to auscultation bilaterally.  No stridor.  Patient speaking in full sentences.  Patient tolerating secretions.  Abdomen is soft/nontender.  Pulses are normal throughout.  Neuro exam is nonfocal.  Otherwise normal physical exam.    Patient presenting for likely left peritonsillar abscess/phlegmon.  ENT consulted and will perform I&D at bedside.  We will continue IV clindamycin dose at this time.  Dispo pending I&D and ENT recs.

## 2023-10-09 NOTE — ED PROVIDER NOTE - NSFOLLOWUPINSTRUCTIONS_ED_ALL_ED_FT
You were seen in the ED for a peritonsillar abscess. You were evaluated by ENT, and the abscess was drained, and your symptoms improved. You were given antibiotics in the ED for the abscess, and a prescription of clindamycin was prescribed to your pharmacy for clindamycin for 14 days. Please take every 6 hours for 14 days. If you notice signs of infection which include fever, redness, swelling please return to the ED.     Peritonsillar Abscess  An open mouth showing the tonsils.  A peritonsillar abscess is a collection of pus in the back of the throat, behind the tonsils. It usually occurs when an infection of the throat or tonsils (tonsillitis) spreads into the tissues around the tonsils.    What are the causes?  The infection that leads to a peritonsillar abscess is usually caused by streptococcal bacteria.    What increases the risk?  You are more likely to develop this condition if:  You have recently been diagnosed with an infection in your mouth or throat.  You smoke.  You have gum disease or gingivitis (periodontal disease).  What are the signs or symptoms?  Early symptoms of this condition include:  Fever and chills.  A sore throat, often with pain on just one side.  Swollen, tender glands (lymph nodes) in the neck.  Headache.  As the infection gets worse, symptoms may include:  Difficulty swallowing.  Drooling because of difficulty swallowing saliva.  Difficulty opening your mouth.  Bad breath.  Changes in how the voice sounds.  How is this diagnosed?  This condition may be diagnosed based on:  Your symptoms and medical history.  A physical exam.  Imaging tests, such as ultrasound or CT scan.  Testing a pus sample from the abscess. Your health care provider may collect a pus sample by swabbing the back of your throat or by removing some pus with a syringe and needle (needle aspiration).  How is this treated?  Treatment usually involves draining the pus from the abscess. This may be done through needle aspiration or by making an incision in the abscess and draining the fluid. You will also likely need to take antibiotic medicine.    Follow these instructions at home:  Medicines    Take over-the-counter and prescription medicines only as told by your health care provider.  If you were prescribed an antibiotic, take it as told by your health care provider. Do not stop taking the antibiotic even if you start to feel better.  Eating and drinking    A diet of soft foods, including apple sauce, yogurt, and a smoothie.   Drink enough fluid to keep your urine pale yellow.  While your throat is sore, try only drinking liquids or eating only soft-textured foods such as yogurt and ice cream.  Activity    Rest as told by your health care provider.  Return to your normal activities as told by your health care provider. Ask your health care provider what activities are safe for you.  General Instructions    If your abscess was drained, gargle with a mixture of salt and water 3–4 times a day or as needed.  To make salt water, completely dissolve ½–1 tsp (3–6 g) of salt in 1 cup (237 mL) of warm water.  Do not swallow this mixture.  Do not use any products that contain nicotine or tobacco. These products include cigarettes, chewing tobacco, and vaping devices, such as e-cigarettes. If you need help quitting, ask your health care provider.  Keep all follow-up visits. This is important.  Contact a health care provider if:  You have more pain, swelling, redness, or pus in your throat.  You have a headache.  You have a lack of energy (lethargy) or feel generally sick.  You have a fever or chills.  You have trouble swallowing or eating.  You have signs of dehydration, such as:  Light-headedness or dizziness when standing.  Urinating less than usual.  A fast heart rate.  Dry mouth.  Get help right away if:  You are unable to swallow.  You have trouble breathing, or it is easier for you to breathe when you lean forward.  You cough up blood or vomit blood after treatment.  You have severe throat pain that does not get better with medicine.  These symptoms may represent a serious problem that is an emergency. Do not wait to see if the symptoms will go away. Get medical help right away. Call your local emergency services (911 in the [place] Do not drive yourself to the hospital.    Summary  A peritonsillar abscess is a collection of pus in the back of the throat. It usually occurs when an infection of the throat or tonsils spreads to surrounding tissues.  Symptoms include a sore throat, difficulty swallowing, fever, chills, and occasional drooling.  This condition is treated by draining the abscess and taking antibiotic medicine.  Call your health care provider if you have trouble swallowing or eating after treatment.  Get help right away if you vomit blood or cough up blood after treatment.  This information is not intended to replace advice given to you by your health care provider. Make sure you discuss any questions you have with your health care provider. You were seen in the ED for a peritonsillar abscess. You were evaluated by ENT, and the abscess was drained, and your symptoms improved. You were given antibiotics in the ED for the abscess, and a prescription of clindamycin was prescribed to your pharmacy for clindamycin for 14 days. Please take every 6 hours for 14 days. If you notice signs of infection which include fever, redness, swelling PLEASE RETURN TO THE ED    Peritonsillar Abscess  An open mouth showing the tonsils.  A peritonsillar abscess is a collection of pus in the back of the throat, behind the tonsils. It usually occurs when an infection of the throat or tonsils (tonsillitis) spreads into the tissues around the tonsils.    What are the causes?  The infection that leads to a peritonsillar abscess is usually caused by streptococcal bacteria.    What increases the risk?  You are more likely to develop this condition if:  You have recently been diagnosed with an infection in your mouth or throat.  You smoke.  You have gum disease or gingivitis (periodontal disease).  What are the signs or symptoms?  Early symptoms of this condition include:  Fever and chills.  A sore throat, often with pain on just one side.  Swollen, tender glands (lymph nodes) in the neck.  Headache.  As the infection gets worse, symptoms may include:  Difficulty swallowing.  Drooling because of difficulty swallowing saliva.  Difficulty opening your mouth.  Bad breath.  Changes in how the voice sounds.  How is this diagnosed?  This condition may be diagnosed based on:  Your symptoms and medical history.  A physical exam.  Imaging tests, such as ultrasound or CT scan.  Testing a pus sample from the abscess. Your health care provider may collect a pus sample by swabbing the back of your throat or by removing some pus with a syringe and needle (needle aspiration).  How is this treated?  Treatment usually involves draining the pus from the abscess. This may be done through needle aspiration or by making an incision in the abscess and draining the fluid. You will also likely need to take antibiotic medicine.    Follow these instructions at home:  Medicines    Take over-the-counter and prescription medicines only as told by your health care provider.  If you were prescribed an antibiotic, take it as told by your health care provider. Do not stop taking the antibiotic even if you start to feel better.  Eating and drinking    A diet of soft foods, including apple sauce, yogurt, and a smoothie.   Drink enough fluid to keep your urine pale yellow.  While your throat is sore, try only drinking liquids or eating only soft-textured foods such as yogurt and ice cream.  Activity    Rest as told by your health care provider.  Return to your normal activities as told by your health care provider. Ask your health care provider what activities are safe for you.  General Instructions    If your abscess was drained, gargle with a mixture of salt and water 3–4 times a day or as needed.  To make salt water, completely dissolve ½–1 tsp (3–6 g) of salt in 1 cup (237 mL) of warm water.  Do not swallow this mixture.  Do not use any products that contain nicotine or tobacco. These products include cigarettes, chewing tobacco, and vaping devices, such as e-cigarettes. If you need help quitting, ask your health care provider.  Keep all follow-up visits. This is important.  Contact a health care provider if:  You have more pain, swelling, redness, or pus in your throat.  You have a headache.  You have a lack of energy (lethargy) or feel generally sick.  You have a fever or chills.  You have trouble swallowing or eating.  You have signs of dehydration, such as:  Light-headedness or dizziness when standing.  Urinating less than usual.  A fast heart rate.  Dry mouth.  Get help right away if:  You are unable to swallow.  You have trouble breathing, or it is easier for you to breathe when you lean forward.  You cough up blood or vomit blood after treatment.  You have severe throat pain that does not get better with medicine.  These symptoms may represent a serious problem that is an emergency. Do not wait to see if the symptoms will go away. Get medical help right away. Call your local emergency services (911 in the [place] Do not drive yourself to the hospital.    Summary  A peritonsillar abscess is a collection of pus in the back of the throat. It usually occurs when an infection of the throat or tonsils spreads to surrounding tissues.  Symptoms include a sore throat, difficulty swallowing, fever, chills, and occasional drooling.  This condition is treated by draining the abscess and taking antibiotic medicine.  Call your health care provider if you have trouble swallowing or eating after treatment.  Get help right away if you vomit blood or cough up blood after treatment.  This information is not intended to replace advice given to you by your health care provider. Make sure you discuss any questions you have with your health care provider. You were seen in the ED for a peritonsillar abscess. You were evaluated by ENT, and the abscess was drained, and your symptoms improved. You were given antibiotics in the ED for the abscess, and a prescription of clindamycin was prescribed to your pharmacy for clindamycin for 14 days. Please take every 6 hours for 14 days. If you notice signs of infection which include fever, redness, swelling, are unable to tolerate foods, have pain with opening of the mouth, have a muffled voice, changes in voice, increasing shortness of breath, decreased range of motion in the neck PLEASE RETURN TO THE ED    Peritonsillar Abscess  An open mouth showing the tonsils.  A peritonsillar abscess is a collection of pus in the back of the throat, behind the tonsils. It usually occurs when an infection of the throat or tonsils (tonsillitis) spreads into the tissues around the tonsils.    What are the causes?  The infection that leads to a peritonsillar abscess is usually caused by streptococcal bacteria.    What increases the risk?  You are more likely to develop this condition if:  You have recently been diagnosed with an infection in your mouth or throat.  You smoke.  You have gum disease or gingivitis (periodontal disease).  What are the signs or symptoms?  Early symptoms of this condition include:  Fever and chills.  A sore throat, often with pain on just one side.  Swollen, tender glands (lymph nodes) in the neck.  Headache.  As the infection gets worse, symptoms may include:  Difficulty swallowing.  Drooling because of difficulty swallowing saliva.  Difficulty opening your mouth.  Bad breath.  Changes in how the voice sounds.  How is this diagnosed?  This condition may be diagnosed based on:  Your symptoms and medical history.  A physical exam.  Imaging tests, such as ultrasound or CT scan.  Testing a pus sample from the abscess. Your health care provider may collect a pus sample by swabbing the back of your throat or by removing some pus with a syringe and needle (needle aspiration).  How is this treated?  Treatment usually involves draining the pus from the abscess. This may be done through needle aspiration or by making an incision in the abscess and draining the fluid. You will also likely need to take antibiotic medicine.    Follow these instructions at home:  Medicines    Take over-the-counter and prescription medicines only as told by your health care provider.  If you were prescribed an antibiotic, take it as told by your health care provider. Do not stop taking the antibiotic even if you start to feel better.  Eating and drinking    A diet of soft foods, including apple sauce, yogurt, and a smoothie.   Drink enough fluid to keep your urine pale yellow.  While your throat is sore, try only drinking liquids or eating only soft-textured foods such as yogurt and ice cream.  Activity    Rest as told by your health care provider.  Return to your normal activities as told by your health care provider. Ask your health care provider what activities are safe for you.  General Instructions    If your abscess was drained, gargle with a mixture of salt and water 3–4 times a day or as needed.  To make salt water, completely dissolve ½–1 tsp (3–6 g) of salt in 1 cup (237 mL) of warm water.  Do not swallow this mixture.  Do not use any products that contain nicotine or tobacco. These products include cigarettes, chewing tobacco, and vaping devices, such as e-cigarettes. If you need help quitting, ask your health care provider.  Keep all follow-up visits. This is important.  Contact a health care provider if:  You have more pain, swelling, redness, or pus in your throat.  You have a headache.  You have a lack of energy (lethargy) or feel generally sick.  You have a fever or chills.  You have trouble swallowing or eating.  You have signs of dehydration, such as:  Light-headedness or dizziness when standing.  Urinating less than usual.  A fast heart rate.  Dry mouth.  Get help right away if:  You are unable to swallow.  You have trouble breathing, or it is easier for you to breathe when you lean forward.  You cough up blood or vomit blood after treatment.  You have severe throat pain that does not get better with medicine.  These symptoms may represent a serious problem that is an emergency. Do not wait to see if the symptoms will go away. Get medical help right away. Call your local emergency services (911 in the [place] Do not drive yourself to the hospital.    Summary  A peritonsillar abscess is a collection of pus in the back of the throat. It usually occurs when an infection of the throat or tonsils spreads to surrounding tissues.  Symptoms include a sore throat, difficulty swallowing, fever, chills, and occasional drooling.  This condition is treated by draining the abscess and taking antibiotic medicine.  Call your health care provider if you have trouble swallowing or eating after treatment.  Get help right away if you vomit blood or cough up blood after treatment.  This information is not intended to replace advice given to you by your health care provider. Make sure you discuss any questions you have with your health care provider.

## 2023-10-09 NOTE — ED PROVIDER NOTE - IV ALTEPLASE EXCL REL HIDDEN
Other (Free Text): Dad would prefer to not use oral medications\\nWill try onexton in AM and tretinoin in PM; discussed will hopefully keep under control but realistically will not clear skin; advised to f/u if deeper lesions are becoming more frequent or scarring becomes a concern Note Text (......Xxx Chief Complaint.): This diagnosis correlates with the Detail Level: Detailed show

## 2023-10-09 NOTE — ED ADULT NURSE NOTE - NSFALLUNIVINTERV_ED_ALL_ED
Bed/Stretcher in lowest position, wheels locked, appropriate side rails in place/Call bell, personal items and telephone in reach/Instruct patient to call for assistance before getting out of bed/chair/stretcher/Non-slip footwear applied when patient is off stretcher/[place] to call system/Physically safe environment - no spills, clutter or unnecessary equipment/Purposeful proactive rounding/Room/bathroom lighting operational, light cord in reach

## 2023-10-09 NOTE — ED ADULT NURSE REASSESSMENT NOTE - NS ED NURSE REASSESS COMMENT FT1
received report from ness sutherland. Pt is a/o x 3. pt medicated as per md orders. pt airway patent, no signs of drooling. pt speaking in full complete sentences. no complaints of chest pain, headache, nausea, dizzniness, vomiting, SOB, fever, chills   verbalized. Pt has 20g iv placed to right AC with no redness or swelling noted. pt respirations even and unlabored. pt pending dispo

## 2023-10-09 NOTE — CONSULT NOTE ADULT - ASSESSMENT
Patient is a 48y Male with HTN on lisinopril, recurrent strep presents with several days of odynophagia. CT at OSH showed R PTA vs. phlegmon as well as retropharyngeal edema but no drainable RP collection. Patient reports muffled voice for 3 days. No SOB. No dysphagia. + trismus. + R ear pain. No fevers at home. Took azithromycin for 1 day. FOE showed tonsillar hypertrophy but no retropharyngeal bulge. Exam showed right PTA and normal neck ROM. Performed I and D of R PTA. He reported significant improvement in pressure sensation and mouth opening after procedure. Would monitor in CDU overnight to ensure improvement given RP edema on scan.     - clindamycin for 2 wks  - observe in CDU   - diet OK   - fu outpt for recurrent strep infections (no prior PTA though)   - fu cultures    Patient is a 48y Male with HTN on lisinopril, recurrent strep presents with several days of odynophagia. CT at OSH showed R PTA vs. phlegmon as well as retropharyngeal edema but no drainable RP collection. Patient reports muffled voice for 3 days. No SOB. No dysphagia. + trismus. + R ear pain. No fevers at home. Took azithromycin for 1 day. FOE showed tonsillar hypertrophy but no retropharyngeal bulge. Exam showed right PTA and normal neck ROM. Performed I and D of R PTA. He reported significant improvement in pressure sensation and mouth opening after procedure. Would monitor in CDU overnight to ensure improvement given RP edema on scan.     - clindamycin for 2 wks  - observe in CDU   - diet OK   - decadron 10 x1   - fu outpt for recurrent strep infections (no prior PTA though)   - fu cultures    Patient is a 48y Male with HTN on lisinopril, recurrent strep presents with several days of odynophagia. CT at OSH showed L PTA vs. phlegmon as well as retropharyngeal edema but no drainable RP collection. Patient reports muffled voice for 3 days. No SOB. No dysphagia. + trismus. + L ear pain. No fevers at home. Took azithromycin for 1 day. FOE showed tonsillar hypertrophy (L>R) but no retropharyngeal bulge. Exam showed right PTA, mild uvular edema and normal neck ROM. Performed I and D of R PTA. He reported significant improvement in pressure sensation and mouth opening after procedure. Would monitor in CDU overnight to ensure improvement given RP edema on scan.     - clindamycin for 2 wks  - observe in CDU   - diet OK   - decadron 10 x1   - fu outpt for recurrent strep infections (no prior PTA though)   - fu cultures

## 2023-10-09 NOTE — ED ADULT NURSE NOTE - COVID-19  TEST TYPE
No transition of care outreach indicated due to patient discharge to a [facility], [facility]). MOLECULAR PCR

## 2023-10-09 NOTE — ED PROVIDER NOTE - PROGRESS NOTE DETAILS
Aleah Redding, PGY1     ENT at bedside for I and D. Aleah Redding, PGY1    Pt refuses to stay for CDU, Aleah Redding, PGY1    ENT recommended CDU.  Pt refuses to stay for CDU, ENT aware. strict return precautions given to return.

## 2023-10-09 NOTE — ED ADULT NURSE NOTE - OBJECTIVE STATEMENT
48 yom presents A&Ox4, sent into ED as a transfer from [facility] for ENT eval due to abscess on L side of neck. Pt speaking in full sentences without any difficulty, able to clear secretions, sating 100% on room air. Pt denies any sob, dizziness, nausea, vomiting, chest pain or fevers. Pt arrived w/ 20g IV to R AC. Pending ENT eval. Bed in lowest, safety maintained, call bell within reach.

## 2023-10-09 NOTE — CONSULT NOTE ADULT - SUBJECTIVE AND OBJECTIVE BOX
HPI:  Patient is a 48y Male with HTN on lisinopril, recurrent strep presents with several days of odynophagia. CT at OSH showed R PTA vs. phlegmon as well as retropharyngeal edema but no drainable RP collection. Patient reports muffled voice for 3 days. No SOB. No dysphagia. + trismus. + R ear pain. No fevers at home. Took azithromycin for 1 day.       Physical Exam  T(C): 36.5 (10-08-23 @ 23:23), Max: 38.2 (10-08-23 @ 14:35)  HR: 87 (10-08-23 @ 23:23) (87 - 102)  BP: 146/88 (10-08-23 @ 23:23) (146/88 - 170/94)  RR: 18 (10-08-23 @ 23:23) (18 - 19)  SpO2: 95% (10-08-23 @ 23:23) (95% - 98%)  General: NAD, A+Ox3  No respiratory distress, stridor, or stertor  Voice quality: muffled  Face:  Symmetric without masses or lesions  Nose: nasal cavity clear bilaterally, inferior turbinates normal, mucosa normal without crusting or bleeding  OC/OP: tongue normal, floor of mouth wnl, no masses or lesions, uvular edema and R peritonsillar fullness   Neck: soft/flat, no LAD, normal neck ROM      Procedure: Flexible laryngoscopy    Pre-procedure diagnosis/Indication for procedure: To evaluate larynx    Anesthesia: None    Description:    A flexible endoscope was used to examine the left and right nasal cavities, posterior oropharynx, hypopharynx, and larynx. The nasal valve areas were examined for abnormalities or collapse. The inferior and middle turbinates were evaluated. The middle and superior meatuses, the sphenoethmoid recesses, and the nasopharynx were examined and inspected for mucopurulence, polyps, and nasal masses. The oropharynx, tongue base/vallecula, epiglottis, aryepiglottic folds, arytenoids, vocal cords, hypopharynx were also inspected for swelling, inflammation, or dysfunction. The patient tolerated the procedure without complications.    Findings:    NP wnl  BOT/vallecula normal  Bilateral tonsillar hypertrophy   Epiglottis sharp  AE folds nonedematous  Arytenoids mobile  Vocal folds mobile bilaterally  No masses or lesions visualized in post cricoid space or pyriform sinuses bilaterally    Procedure: INcision and drainage of PTA    After verification of informed consent, 5 cc 1:100,000 lidocaine: epinephrine injected into Right peritonsillar region. 18 gauge needle used to aspirate ~6 cc pus. Pus sent for culture. 11 blade used to make stab incision and clamps used to break up loculations. patient tolerated procedure well and without complication. He reported significant improvement in pressure sensation and mouth opening after procedure.  HPI:  Patient is a 48y Male with HTN on lisinopril, recurrent strep presents with several days of odynophagia. CT at OSH showed L PTA vs. phlegmon as well as retropharyngeal edema but no drainable RP collection. Patient reports muffled voice for 3 days. No SOB. No dysphagia. + trismus. + L ear pain. No fevers at home. Took azithromycin for 1 day.       Physical Exam  T(C): 36.5 (10-08-23 @ 23:23), Max: 38.2 (10-08-23 @ 14:35)  HR: 87 (10-08-23 @ 23:23) (87 - 102)  BP: 146/88 (10-08-23 @ 23:23) (146/88 - 170/94)  RR: 18 (10-08-23 @ 23:23) (18 - 19)  SpO2: 95% (10-08-23 @ 23:23) (95% - 98%)  General: NAD, A+Ox3  No respiratory distress, stridor, or stertor  Voice quality: muffled  Face:  Symmetric without masses or lesions  Nose: nasal cavity clear bilaterally, inferior turbinates normal, mucosa normal without crusting or bleeding  OC/OP: tongue normal, floor of mouth wnl, no masses or lesions, uvular edema and L peritonsillar fullness   Neck: soft/flat, no LAD, normal neck ROM      Procedure: Flexible laryngoscopy    Pre-procedure diagnosis/Indication for procedure: To evaluate larynx    Anesthesia: None    Description:    A flexible endoscope was used to examine the left and right nasal cavities, posterior oropharynx, hypopharynx, and larynx. The nasal valve areas were examined for abnormalities or collapse. The inferior and middle turbinates were evaluated. The middle and superior meatuses, the sphenoethmoid recesses, and the nasopharynx were examined and inspected for mucopurulence, polyps, and nasal masses. The oropharynx, tongue base/vallecula, epiglottis, aryepiglottic folds, arytenoids, vocal cords, hypopharynx were also inspected for swelling, inflammation, or dysfunction. The patient tolerated the procedure without complications.    Findings:    NP wnl  BOT/vallecula normal  Bilateral tonsillar hypertrophy L>R   Epiglottis sharp  AE folds nonedematous  Arytenoids mobile  Vocal folds mobile bilaterally  No masses or lesions visualized in post cricoid space or pyriform sinuses bilaterally    Procedure: INcision and drainage of PTA    After verification of informed consent, 5 cc 1:100,000 lidocaine: epinephrine injected into left peritonsillar region. 18 gauge needle used to aspirate ~6 cc pus. Pus sent for culture. 11 blade used to make stab incision and clamps used to break up loculations. patient tolerated procedure well and without complication. He reported significant improvement in pressure sensation and mouth opening after procedure.

## 2023-10-09 NOTE — ED PROVIDER NOTE - PATIENT PORTAL LINK FT
You can access the FollowMyHealth Patient Portal offered by [facility] by registering at the following website: [email or web address]. By joining [employer]’s FollowMyHealth portal, you will also be able to view your health information using other applications (apps) compatible with our system. You can access the FollowMyHealth Patient Portal offered by [facility] by registering at the following website: [email or web address]. By joining [employer]’s FollowMyHealth portal, you will also be able to view your health information using other applications (apps) compatible with our system.

## 2023-10-09 NOTE — ED ADULT NURSE NOTE - CAS ELECT INFOMATION PROVIDED
11/2/2021      Re:     Nolberto Muir  [address]  [facility] [address]      To Whom It May Concern:       This is to certify that Nolberto Muir was seen in the clinic today and is to be medically excused until testing is back.     SIGNATURE:___________________________________________,   11/2/2021      SAYRA Engel          [facility]  [address] [facility] [id number] [phone]   provider/DC instructions

## 2023-10-09 NOTE — ED ADULT NURSE NOTE - CHIEF COMPLAINT QUOTE
Pt a transfer from [place] for ENT. Patient has abscess on left side of neck. Pt c/o diff swallowing and sob. Pt appears comfortable. Pt denies chest pain sob. PHX cardiac stents, HTN

## 2023-10-09 NOTE — ED ADULT NURSE NOTE - NSFALLRISK_ED_ALL_ED
[FreeTextEntry1] : 31 year old female with stage IIA left breast TNBC (T2N0) S/P bilateral mastectomy on 6/29/18. She has DPD deficiency.  S/p 4 cycles of dose dense AC, followed by weekly taxol completed 12/26/18.\par Post chemotherapy she was incidentally found to have anterior mediastinal mass c/w thymic rebound. \par \par Clinically remains in remission. Discussed again that there is no role for routine surveillance imaging in women who have completed treatment for breast cancer, and also discussed there is no role for surveillance by tumor markers, and data shows that this does not improve survival.   Her CT chest showed left lung nodule 3 mm, and decrease in size of thymic tissue. MRI abd was negative for any pathology. \par \par She has intermittent blurry vision of the R eye and focal tenderness of orbital bone where the eyebrow begins. No sinus tenderness. \par \par Plan:\par - US breast next month to follow up on fat necrosis of L breast\par -opthalmology evaluation was advised for blurry vision of R eye\par -continue follow up with CT surgery \par -F/u in 3 months 
No

## 2023-11-30 ENCOUNTER — EMERGENCY (EMERGENCY)
Facility: HOSPITAL | Age: 48
LOS: 1 days | Discharge: HOME | End: 2023-11-30
Attending: EMERGENCY MEDICINE | Admitting: STUDENT IN AN ORGANIZED HEALTH CARE EDUCATION/TRAINING PROGRAM
Payer: SELF-PAY

## 2023-11-30 VITALS
TEMPERATURE: 98 F | DIASTOLIC BLOOD PRESSURE: 116 MMHG | SYSTOLIC BLOOD PRESSURE: 188 MMHG | WEIGHT: 270.07 LBS | RESPIRATION RATE: 16 BRPM | HEART RATE: 82 BPM | OXYGEN SATURATION: 98 % | HEIGHT: 68 IN

## 2023-11-30 VITALS — DIASTOLIC BLOOD PRESSURE: 96 MMHG | SYSTOLIC BLOOD PRESSURE: 159 MMHG

## 2023-11-30 DIAGNOSIS — Z95.5 PRESENCE OF CORONARY ANGIOPLASTY IMPLANT AND GRAFT: Chronic | ICD-10-CM

## 2023-11-30 PROCEDURE — 99283 EMERGENCY DEPT VISIT LOW MDM: CPT

## 2023-11-30 PROCEDURE — 99284 EMERGENCY DEPT VISIT MOD MDM: CPT

## 2023-11-30 PROCEDURE — 73030 X-RAY EXAM OF SHOULDER: CPT

## 2023-11-30 PROCEDURE — 96372 THER/PROPH/DIAG INJ SC/IM: CPT

## 2023-11-30 PROCEDURE — 73030 X-RAY EXAM OF SHOULDER: CPT | Mod: 26,RT

## 2023-11-30 RX ORDER — LISINOPRIL 2.5 MG/1
10 TABLET ORAL ONCE
Refills: 0 | Status: COMPLETED | OUTPATIENT
Start: 2023-11-30 | End: 2023-11-30

## 2023-11-30 RX ORDER — CARVEDILOL PHOSPHATE 80 MG/1
3.12 CAPSULE, EXTENDED RELEASE ORAL ONCE
Refills: 0 | Status: COMPLETED | OUTPATIENT
Start: 2023-11-30 | End: 2023-11-30

## 2023-11-30 RX ORDER — KETOROLAC TROMETHAMINE 30 MG/ML
30 SYRINGE (ML) INJECTION ONCE
Refills: 0 | Status: DISCONTINUED | OUTPATIENT
Start: 2023-11-30 | End: 2023-11-30

## 2023-11-30 RX ORDER — IBUPROFEN 200 MG
1 TABLET ORAL
Qty: 15 | Refills: 0
Start: 2023-11-30 | End: 2023-12-04

## 2023-11-30 RX ADMIN — Medication 30 MILLIGRAM(S): at 15:36

## 2023-11-30 RX ADMIN — Medication 30 MILLIGRAM(S): at 15:06

## 2023-11-30 RX ADMIN — CARVEDILOL PHOSPHATE 3.12 MILLIGRAM(S): 80 CAPSULE, EXTENDED RELEASE ORAL at 15:05

## 2023-11-30 RX ADMIN — LISINOPRIL 10 MILLIGRAM(S): 2.5 TABLET ORAL at 15:05

## 2023-11-30 NOTE — ED ADULT TRIAGE NOTE - CHIEF COMPLAINT QUOTE
Pt c/o right shoulder pain for 1 week.  Pt also found to be hypertensive, did not take his meds today.  188/116.  Pt on lisinopril 10mg. Pt c/o right shoulder pain for 1 week, denies any trauma. Pt also found to be hypertensive, did not take his meds today.  188/116.  Pt on lisinopril 10mg.

## 2023-11-30 NOTE — ED ADULT NURSE NOTE - CHIEF COMPLAINT QUOTE
Pt c/o right shoulder pain for 1 week, denies any trauma. Pt also found to be hypertensive, did not take his meds today.  188/116.  Pt on lisinopril 10mg.

## 2023-11-30 NOTE — ED PROVIDER NOTE - PATIENT PORTAL LINK FT
You can access the FollowMyHealth Patient Portal offered by [facility] by registering at the following website: [email or web address]. By joining [employer]’s FollowMyHealth portal, you will also be able to view your health information using other applications (apps) compatible with our system.

## 2023-11-30 NOTE — ED PROVIDER NOTE - NSFOLLOWUPINSTRUCTIONS_ED_ALL_ED_FT
please follow up with orthopedic physician as scheduled    please take your blood pressure medication as prescribed. if any dizziness/headache/focal weakness/numbness, return to the nearest ED immediately

## 2023-11-30 NOTE — ED PROVIDER NOTE - OBJECTIVE STATEMENT
48 year old male with right shoulder pain for 3 weeks. Denies trauma, sob, chest/abdominal/back/neck pain, HA, focal weakness/numbness. Denies any other symptoms.

## 2023-11-30 NOTE — ED PROVIDER NOTE - CLINICAL SUMMARY MEDICAL DECISION MAKING FREE TEXT BOX
48 year old male with right shoulder pain, no trauma, x ray showed no acute fracture/dislocation, arm sling placed, ortho fu     elevated BP noted, patient is asymptomatic, home med given, repeat BP improved

## 2023-11-30 NOTE — ED ADULT NURSE NOTE - OBJECTIVE STATEMENT
Patient came from home with complaint of R shoulder pain for one week. Patient denies any recent falls or trauma.

## 2023-11-30 NOTE — ED ADULT NURSE NOTE - NSFALLUNIVINTERV_ED_ALL_ED
Bed/Stretcher in lowest position, wheels locked, appropriate side rails in place/Call bell, personal items and telephone in reach/Instruct patient to call for assistance before getting out of bed/chair/stretcher/Non-slip footwear applied when patient is off stretcher/[place] to call system/Physically safe environment - no spills, clutter or unnecessary equipment/Purposeful proactive rounding/Room/bathroom lighting operational, light cord in reach

## 2023-12-06 ENCOUNTER — APPOINTMENT (OUTPATIENT)
Dept: FAMILY MEDICINE | Facility: HOSPITAL | Age: 48
End: 2023-12-06

## 2024-01-14 NOTE — ED PROVIDER NOTE - EKG ADDITIONAL QUESTION - PERFORMED INDEPENDENT VISUALIZATION
Problem: Discharge Planning  Goal: Discharge to home or other facility with appropriate resources  Outcome: Progressing     Problem: Safety - Adult  Goal: Free from fall injury  Outcome: Progressing     Problem: Skin/Tissue Integrity  Goal: Absence of new skin breakdown  Description: 1.  Monitor for areas of redness and/or skin breakdown  2.  Assess vascular access sites hourly  3.  Every 4-6 hours minimum:  Change oxygen saturation probe site  4.  Every 4-6 hours:  If on nasal continuous positive airway pressure, respiratory therapy assess nares and determine need for appliance change or resting period.  Outcome: Progressing      Yes

## 2024-02-02 ENCOUNTER — EMERGENCY (EMERGENCY)
Facility: HOSPITAL | Age: 49
LOS: 1 days | Discharge: HOME | End: 2024-02-02
Attending: STUDENT IN AN ORGANIZED HEALTH CARE EDUCATION/TRAINING PROGRAM | Admitting: EMERGENCY MEDICINE
Payer: SELF-PAY

## 2024-02-02 VITALS
DIASTOLIC BLOOD PRESSURE: 104 MMHG | HEART RATE: 85 BPM | TEMPERATURE: 98 F | RESPIRATION RATE: 18 BRPM | SYSTOLIC BLOOD PRESSURE: 191 MMHG | OXYGEN SATURATION: 99 % | HEIGHT: 68 IN | WEIGHT: 270.07 LBS

## 2024-02-02 DIAGNOSIS — Z95.5 PRESENCE OF CORONARY ANGIOPLASTY IMPLANT AND GRAFT: Chronic | ICD-10-CM

## 2024-02-02 PROCEDURE — 99283 EMERGENCY DEPT VISIT LOW MDM: CPT

## 2024-02-02 PROCEDURE — 99284 EMERGENCY DEPT VISIT MOD MDM: CPT

## 2024-02-02 PROCEDURE — 73610 X-RAY EXAM OF ANKLE: CPT

## 2024-02-02 PROCEDURE — 73610 X-RAY EXAM OF ANKLE: CPT | Mod: 26,LT

## 2024-02-02 RX ORDER — ACETAMINOPHEN 500 MG
650 TABLET ORAL ONCE
Refills: 0 | Status: COMPLETED | OUTPATIENT
Start: 2024-02-02 | End: 2024-02-02

## 2024-02-02 RX ORDER — IBUPROFEN 200 MG
600 TABLET ORAL ONCE
Refills: 0 | Status: COMPLETED | OUTPATIENT
Start: 2024-02-02 | End: 2024-02-02

## 2024-02-02 RX ADMIN — Medication 600 MILLIGRAM(S): at 14:37

## 2024-02-02 RX ADMIN — Medication 650 MILLIGRAM(S): at 14:38

## 2024-02-02 NOTE — ED PROVIDER NOTE - OBJECTIVE STATEMENT
48m no pmhx presenting with left ankle pain s/p mechanical slip and twisting of ankle today. Mild achy positional pain. Able to ambulate with mild limp. Otherwise: denies any chest pain, abdominal pain, shortness of breath, nausea/vomiting, head trauma, neck pain, back pain, hip pain, other extremity injury, LOC, syncope, lightheadedness, anticoagulation use, headaches, visual complaints, rashes, fevers/chills, difficulty ambulating, abrasions, lacerations, subjective neurological deficits, numbness/tingling.

## 2024-02-02 NOTE — ED PROVIDER NOTE - PHYSICAL EXAMINATION
VITAL SIGNS: I have reviewed nursing notes and confirm.   GEN: Well-developed; well-nourished; in no acute distress. Speaking full sentences.  SKIN: Warm, pink, no rash, no diaphoresis, no cyanosis, well perfused.   HEAD: Normocephalic; atraumatic. No scalp lacerations, no abrasions.  NECK: Supple; non tender.   EYES: Pupils 3mm equal, round, reactive to light and accomodation, conjunctiva and sclera clear.   ENT: No nasal discharge; airway clear. Trachea is midline. Normal dentition.  NEURO: Alert & oriented x 3, Grossly unremarkable. Sensory and motor intact throughout. No focal deficits. Gait: Fluid. Normal speech and coordination. VITAL SIGNS: I have reviewed nursing notes and confirm.   GEN: Well-developed; well-nourished; in no acute distress. Speaking full sentences.  SKIN: Warm, pink, no rash, no diaphoresis, no cyanosis, well perfused.   HEAD: Normocephalic; atraumatic. No scalp lacerations, no abrasions.  NECK: Supple; non tender.   EYES: Pupils 3mm equal, round, reactive to light and accomodation, conjunctiva and sclera clear.   ENT: No nasal discharge; airway clear. Trachea is midline. Normal dentition.   LEFT FOOT/ANKLE: Proximal tibia non-tender, (+) lateral malleolus ttp mild w/ mild swelling; medial  malleolus non-tender, calcaneus non-tender, tarso-metatarsal region non-tender, base of 5th metatarsal non-tender, rest of foot and ankle without marked tenderness, varus and valgus stress of ankle joint without significant laxity, full ROM with full 5/5 motor strength, skin of plantar section of midfoot without ecchymosis, capillary refill < 2 seconds, distal sensation to soft touch intact, compartments surrounding are soft.   NEURO: Alert & oriented x 3, Grossly unremarkable. Sensory and motor intact throughout. No focal deficits. Gait: Fluid. Normal speech and coordination.

## 2024-02-02 NOTE — ED PROVIDER NOTE - NSFOLLOWUPINSTRUCTIONS_ED_ALL_ED_FT
Rest, Ice, Elevation.     Please take ibuprofen 400 mg orally every 6-8 hours for pain control as needed with food to avoid an upset stomach.    Please follow up with your primary care doctor in 1-2 days for further evaluation - please call for an appointment.     Please return to the emergency room for any increased pain, weakness, fevers, redness, numbness/tingling, or discoloration.

## 2024-02-02 NOTE — ED PROVIDER NOTE - CLINICAL SUMMARY MEDICAL DECISION MAKING FREE TEXT BOX
Pt w/ no significant PMHx presenting with left ankle pain s/p mechanical slip.  Exam and history concerning for musculoskeletal pain vs fracture. There is no evidence of deformity or joint instability. There are no open wounds overlying the affected site.   DDX: Rule out associated traumatic injuries, abrasions, lacerations, head trauma, neck trauma, open fractures.  PLAN:   -analgesia, ice packs & re-assess  -X-ray neg  - orthopedic consultation if needed 48m no pmhx presenting with left ankle pain s/p mechanical slip and twisting of ankle today. Mild achy positional pain. Able to ambulate with mild limp. Otherwise: denies any chest pain, abdominal pain, shortness of breath, nausea/vomiting, head trauma, neck pain, back pain, hip pain, other extremity injury, LOC, syncope, lightheadedness, anticoagulation use, headaches, visual complaints, rashes, fevers/chills, difficulty ambulating, abrasions, lacerations, subjective neurological deficits, numbness/tingling.     Pt w/ no significant PMHx presenting with left ankle pain s/p mechanical slip.  Exam and history concerning for musculoskeletal pain vs fracture. There is no evidence of deformity or joint instability. There are no open wounds overlying the affected site.   DDX: Rule out associated traumatic injuries, abrasions, lacerations, head trauma, neck trauma, open fractures.  PLAN:   -analgesia, ice packs & re-assess  -I independently interpreted the LEFT ankle X-ray to be negative for fracture or dislocation.  - aircast for comfort and dc home.

## 2024-02-02 NOTE — ED PROVIDER NOTE - PATIENT PORTAL LINK FT
You can access the FollowMyHealth Patient Portal offered by [facility] by registering at the following website: [email or web address]. By joining [employer]’s FollowMyHealth portal, you will also be able to view your health information using other applications (apps) compatible with our system.

## 2024-05-31 NOTE — ED PROVIDER NOTE - CROS ED MUSC ALL NEG
Notification via portal   Further testing of your circulation to your brain via carotid ultrasound shows no blockages or cholesterol buildup. negative...

## 2024-06-06 NOTE — ED PROVIDER NOTE - DISPOSITION TYPE
Initiate Treatment: Apply Ammonium Lactate 12% lotion daily to affected areas on the knees. Detail Level: Zone Render In Strict Bullet Format?: No Initiate Treatment: Wash with Ketoconazole 2% shampoo onto rash around and on ears three times a week, let sit 3-5 mins then rinse. ADMIT

## 2024-06-26 ENCOUNTER — EMERGENCY (EMERGENCY)
Facility: HOSPITAL | Age: 49
LOS: 1 days | Discharge: HOME | End: 2024-06-26
Attending: EMERGENCY MEDICINE | Admitting: EMERGENCY MEDICINE
Payer: SELF-PAY

## 2024-06-26 VITALS
OXYGEN SATURATION: 98 % | RESPIRATION RATE: 20 BRPM | HEART RATE: 92 BPM | DIASTOLIC BLOOD PRESSURE: 89 MMHG | SYSTOLIC BLOOD PRESSURE: 170 MMHG

## 2024-06-26 VITALS
DIASTOLIC BLOOD PRESSURE: 93 MMHG | RESPIRATION RATE: 18 BRPM | TEMPERATURE: 98 F | OXYGEN SATURATION: 96 % | WEIGHT: 250 LBS | HEART RATE: 91 BPM | HEIGHT: 68 IN | SYSTOLIC BLOOD PRESSURE: 188 MMHG

## 2024-06-26 DIAGNOSIS — Z95.5 PRESENCE OF CORONARY ANGIOPLASTY IMPLANT AND GRAFT: Chronic | ICD-10-CM

## 2024-06-26 PROCEDURE — 99053 MED SERV 10PM-8AM 24 HR FAC: CPT

## 2024-06-26 PROCEDURE — 73562 X-RAY EXAM OF KNEE 3: CPT | Mod: 26,RT

## 2024-06-26 PROCEDURE — 99283 EMERGENCY DEPT VISIT LOW MDM: CPT

## 2024-06-26 PROCEDURE — 73562 X-RAY EXAM OF KNEE 3: CPT

## 2024-06-26 PROCEDURE — 99284 EMERGENCY DEPT VISIT MOD MDM: CPT

## 2024-06-26 RX ORDER — IBUPROFEN 200 MG
1 TABLET ORAL
Qty: 30 | Refills: 0
Start: 2024-06-26

## 2024-06-26 RX ORDER — IBUPROFEN 200 MG
800 TABLET ORAL ONCE
Refills: 0 | Status: COMPLETED | OUTPATIENT
Start: 2024-06-26 | End: 2024-06-26

## 2024-06-26 RX ORDER — ATORVASTATIN CALCIUM 80 MG/1
1 TABLET, FILM COATED ORAL
Qty: 0 | Refills: 0 | DISCHARGE

## 2024-06-26 RX ORDER — CARVEDILOL PHOSPHATE 80 MG/1
1 CAPSULE, EXTENDED RELEASE ORAL
Qty: 0 | Refills: 0 | DISCHARGE

## 2024-06-26 RX ADMIN — Medication 800 MILLIGRAM(S): at 23:15

## 2024-06-26 RX ADMIN — Medication 800 MILLIGRAM(S): at 22:45

## 2024-11-25 NOTE — ED ADULT TRIAGE NOTE - ARRIVAL FROM
HPI    Pt is here today for routine eye exam. States that he is here today dur to   concerns with his eyes. States that his eyes always seem to be irritated,   but he is not sure if it is due to his allergies. 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 Also states that OD seems to be   slightly more clear than OS even when updating his glasses. States that   this affects his work due to him using the computer 10+ hours per day.   DLS: 2018 Dr. Wheat  (-)Flashes   (-)Floaters   (-)Diplopia   (-)Headaches   (+)Itching   (+)Tearing  (-)Burning  (-)Dryness   (-)Photophobia  (-)Glare   (+)Blurred VA  Past Eye Sx: Laser sx 1993  Eye Meds: Systane BID OU   Last edited by Karen Mason, OD on 11/25/2024  8:45 AM.            Assessment /Plan     For exam results, see Encounter Report.    Presbyopia of both eyes    Dry eye syndrome of bilateral lacrimal glands      1.   Eyeglass Final Rx       Eyeglass Final Rx         Sphere Cylinder Axis Add    Right [place] +0.75 030 +2.75    Left -0.50 +1.25 160 +2.75      Type: PAL    Expiration Date: 11/25/2025                   2. Educated pt on findings. Recommended Systane Complete ATs BID OU. If symptoms worsen or dont improve, RTC. Monitor.      RTC in 1 year for annual eye exam unless changes noted sooner.        
Home

## 2025-02-04 NOTE — HISTORY OF PRESENT ILLNESS
[de-identified] : 47 y/o PMHX CAD HTN HLD Obesity here for f/u medical conditions notes is doing well lost some weight and states cardiology stopped statins and did not put him on anything else pt states has been taking medications as directed. 
No.
